# Patient Record
Sex: FEMALE | Race: WHITE | NOT HISPANIC OR LATINO | Employment: FULL TIME | URBAN - METROPOLITAN AREA
[De-identification: names, ages, dates, MRNs, and addresses within clinical notes are randomized per-mention and may not be internally consistent; named-entity substitution may affect disease eponyms.]

---

## 2022-03-04 ENCOUNTER — HISTORICAL (OUTPATIENT)
Dept: ADMINISTRATIVE | Facility: HOSPITAL | Age: 42
End: 2022-03-04

## 2022-03-04 ENCOUNTER — HOSPITAL ENCOUNTER (OUTPATIENT)
Dept: OBSTETRICS AND GYNECOLOGY | Facility: HOSPITAL | Age: 42
End: 2022-03-05
Attending: OBSTETRICS & GYNECOLOGY | Admitting: OBSTETRICS & GYNECOLOGY

## 2022-03-04 LAB
ABS NEUT (OLG): 7.97 (ref 2.1–9.2)
ALBUMIN SERPL-MCNC: 3.6 G/DL (ref 3.5–5)
ALBUMIN/GLOB SERPL: 0.9 {RATIO} (ref 1.1–2)
ALP SERPL-CCNC: 82 U/L (ref 40–150)
ALT SERPL-CCNC: 137 U/L (ref 0–55)
APPEARANCE, UA: NORMAL
AST SERPL-CCNC: 92 U/L (ref 5–34)
B-HCG FREE SERPL-ACNC: 1652.64 [IU]/L
B-HCG SERPL QL: POSITIVE
BACTERIA SPEC CULT: NORMAL
BASOPHILS # BLD AUTO: 0 10*3/UL (ref 0–0.2)
BASOPHILS NFR BLD AUTO: 0 %
BILIRUB SERPL-MCNC: 0.6 MG/DL
BILIRUB UR QL STRIP: NORMAL
BILIRUBIN DIRECT+TOT PNL SERPL-MCNC: 0.2 (ref 0–0.5)
BILIRUBIN DIRECT+TOT PNL SERPL-MCNC: 0.4 (ref 0–0.8)
BUDDING YEAST: NORMAL
BUN SERPL-MCNC: 8.2 MG/DL (ref 7–18.7)
CALCIUM SERPL-MCNC: 9.6 MG/DL (ref 8.7–10.5)
CASTS, UA: 11
CHLORIDE SERPL-SCNC: 105 MMOL/L (ref 98–107)
CO2 SERPL-SCNC: 19 MMOL/L (ref 22–29)
COLOR UR: NORMAL
CREAT SERPL-MCNC: 0.66 MG/DL (ref 0.55–1.02)
CRYSTALS: NORMAL
EOSINOPHIL # BLD AUTO: 0 10*3/UL (ref 0–0.9)
EOSINOPHIL NFR BLD AUTO: 0 %
ERYTHROCYTE [DISTWIDTH] IN BLOOD BY AUTOMATED COUNT: 13.9 % (ref 11.5–17)
GLOBULIN SER-MCNC: 3.9 G/DL (ref 2.4–3.5)
GLUCOSE (UA): NEGATIVE
GLUCOSE SERPL-MCNC: 99 MG/DL (ref 74–100)
HCG UR QL IA.RAPID: NEGATIVE
HCG UR QL IA.RAPID: POSITIVE
HCT VFR BLD AUTO: 42.1 % (ref 37–47)
HEMOLYSIS INTERF INDEX SERPL-ACNC: 94
HGB BLD-MCNC: 13.4 G/DL (ref 12–16)
HGB UR QL STRIP: NORMAL
ICTERIC INTERF INDEX SERPL-ACNC: 0
KETONES UR QL STRIP: NORMAL
LACTATE SERPL-SCNC: 1.2 MMOL/L (ref 0.5–2.2)
LEUKOCYTE ESTERASE UR QL STRIP: NORMAL
LIPEMIC INTERF INDEX SERPL-ACNC: 4
LYMPHOCYTES # BLD AUTO: 1.3 10*3/UL (ref 0.6–4.6)
LYMPHOCYTES NFR BLD AUTO: 13 %
MANUAL DIFF? (OHS): NO
MCH RBC QN AUTO: 27.8 PG (ref 27–31)
MCHC RBC AUTO-ENTMCNC: 31.8 G/DL (ref 33–36)
MCV RBC AUTO: 87.3 FL (ref 80–94)
MONOCYTES # BLD AUTO: 1 10*3/UL (ref 0.1–1.3)
MONOCYTES NFR BLD AUTO: 9 %
NEUTROPHILS # BLD AUTO: 7.97 10*3/UL (ref 2.1–9.2)
NEUTROPHILS NFR BLD AUTO: 76 %
NITRITE UR QL STRIP: NEGATIVE
PH UR STRIP: 7 [PH] (ref 5–9)
PLATELET # BLD AUTO: 202 10*3/UL (ref 130–400)
PLATELET CLUMPS SUSPECT FLAG (OHS): PRESENT
PMV BLD AUTO: 10.6 FL (ref 9.4–12.4)
POS ERR2 (OHS): NORMAL
POTASSIUM SERPL-SCNC: 4.2 MMOL/L (ref 3.5–5.1)
PROT SERPL-MCNC: 7.5 G/DL (ref 6.4–8.3)
PROT UR QL STRIP: NORMAL
RBC # BLD AUTO: 4.82 10*6/UL (ref 4.2–5.4)
RBC #/AREA URNS HPF: 26 /[HPF] (ref 0–2)
SMALL ROUND CELLS, UA: NORMAL
SODIUM SERPL-SCNC: 134 MMOL/L (ref 136–145)
SP GR UR STRIP: 1.03 (ref 1–1.03)
SPERM URNS QL MICRO: NORMAL
SQUAMOUS EPITHELIAL, UA: 8 (ref 0–4)
UROBILINOGEN UR STRIP-ACNC: 1
WBC # SPEC AUTO: 10.4 10*3/UL (ref 4.5–11.5)
WBC #/AREA URNS HPF: 201 /[HPF] (ref 0–3)

## 2022-03-31 ENCOUNTER — HISTORICAL (OUTPATIENT)
Dept: PREADMISSION TESTING | Facility: HOSPITAL | Age: 42
End: 2022-03-31

## 2022-03-31 LAB
ABS NEUT (OLG): 8.22 (ref 2.1–9.2)
BASOPHILS # BLD AUTO: 0 10*3/UL (ref 0–0.2)
BASOPHILS NFR BLD AUTO: 0 %
EOSINOPHIL # BLD AUTO: 0.2 10*3/UL (ref 0–0.9)
EOSINOPHIL NFR BLD AUTO: 2 %
ERYTHROCYTE [DISTWIDTH] IN BLOOD BY AUTOMATED COUNT: 13.6 % (ref 11.5–17)
HCT VFR BLD AUTO: 42.2 % (ref 37–47)
HGB BLD-MCNC: 13.6 G/DL (ref 12–16)
LYMPHOCYTES # BLD AUTO: 2.2 10*3/UL (ref 0.6–4.6)
LYMPHOCYTES NFR BLD AUTO: 19 %
MANUAL DIFF? (OHS): NO
MCH RBC QN AUTO: 28 PG (ref 27–31)
MCHC RBC AUTO-ENTMCNC: 32.2 G/DL (ref 33–36)
MCV RBC AUTO: 86.8 FL (ref 80–94)
MONOCYTES # BLD AUTO: 0.6 10*3/UL (ref 0.1–1.3)
MONOCYTES NFR BLD AUTO: 6 %
NEUTROPHILS # BLD AUTO: 8.22 10*3/UL (ref 2.1–9.2)
NEUTROPHILS NFR BLD AUTO: 73 %
PLATELET # BLD AUTO: 284 10*3/UL (ref 130–400)
PMV BLD AUTO: 10.8 FL (ref 9.4–12.4)
RBC # BLD AUTO: 4.86 10*6/UL (ref 4.2–5.4)
WBC # SPEC AUTO: 11.3 10*3/UL (ref 4.5–11.5)

## 2022-04-01 ENCOUNTER — HISTORICAL (OUTPATIENT)
Dept: SURGERY | Facility: HOSPITAL | Age: 42
End: 2022-04-01

## 2022-05-14 NOTE — OP NOTE
Patient:   Jaylene Valencia            MRN: [id number]            FIN: [id number]               Age:   41 years     Sex:  Female     :  1980   Associated Diagnoses:   None   Author:   Lorie Helton MD      Preop Diagnosis: Missed     Postop Diagnosis: Same    Procedure: Suction D&C    Surgeon: Lorie Helton M.D.    Anesthesia: General     IVF: 300 mL     EBL: 150 mL    Urine output: 100 mL    Specimen:Products of conception    Complications:None    Findings:  Exam revealed 8 week size mobile uterus, multiparous appearing cervix      Procedure: The patient was taken to the OR, general anesthesia was obtained without difficulty.  She was placed in the dorsal lithotomy position with Dontae stirrups.  Exam under anesthesia revealed the above mentioned findings.  She was then prepped and draped in the normal sterile fashion.  A speculum was placed in the vagina.  A single tooth tenaculum was used to grasp the anterior lip of the cervix. Hegar dilators were used to sequentially dilate the cervix to accommodate an 8mm suction curet. Suction curet was advanced to fundus and suction activated with POC noted in tubing. The uterus was curetted in a clockwise fashion until a gritty feeling was noted and suction curettage was again performed. The specimen was sent to pathology.  The tenaculum was removed from the cervix and the puncture site was noted to be hemostatic.  The internal os  was then noted to be closed.  No bleeding was noted.  The patient tolerated the procedure well.  All instrument and sponge counts were correct times two.  The patient was awakened from general anesthesia and taken to the recovery room in stable condition.

## 2022-05-14 NOTE — H&P
Patient:   Jaylene Valencia            MRN: [id number]            FIN: [id number]               Age:   41 years     Sex:  Female     :  1980   Associated Diagnoses:   None   Author:   Lorie Helton MD      Chief Complaint   3/4/2022 10:49 CST       UTI with lower back pain , sent here from OBGYN. sent here for concern for appendicitis. pt 5 weks pregnant        History of Present Illness        The patient presents with 40 y/o  with early pregnancy presented to ER with fever and flank pain.  she had been taking Macrobid for 2 days with no improvement in symptoms.  Serial hcg levels had been monitored as an outpatient with appropriate rise noted.  Progesterone supplementation had been started due to low levels.  No vaginal bleeding or pelvic pain. .     Health Status   Allergies:    Allergic Reactions (Selected)  Severity Not Documented  Penicillin G benzathine- Rash.      Histories   Past Medical History:    Resolved  None ([id number]):  Resolved.   Family History:    Hypertension  Mother  Hyperlipidemia  Father  Sleep apnea  Father     Procedure history:    D & C in  at 28 Years.  tonsillectomy in  at 7 Years.   Social History        Social & Psychosocial Habits    Alcohol  2013 Risk Assessment: Denies Alcohol Use    Employment/School  2013  Status: Employed    Description: Teacher    Previous employment/school: [employer]    Activity level: Desk/Office    Highest education: University degree(s)    Hazardous equipment operation: No    Exercise  2013 Risk Assessment: Does not exercise    Home/Environment  2013 Risk Assessment: No Risk    Nutrition/Health  2013 Risk Assessment: No Risk    Substance Use  2013 Risk Assessment: Denies Substance Abuse    Tobacco  2013 Risk Assessment: Denies Tobacco Use    2022  Use: Never (less than 100 in l    Patient Wants Consult For Cessation Counseling No    Abuse/Neglect  2022  SHX Any  signs of abuse or neglect No    Feels unsafe at home: No    Safe place to go: Yes  .        Physical Examination      Vital Signs (last 24 hrs)_____  Last Charted___________  Temp Oral     36.9 DegC  (MAR 05 07:09)  Heart Rate Peripheral   84 bpm  (MAR 05 07:09)  Resp Rate         20 br/min  (MAR 04 14:30)  SBP      117 mmHg  (MAR 05 07:09)  DBP      69 mmHg  (MAR 05 07:09)  SpO2      100 %  (MAR 04 14:30)  Weight      118 kg  (MAR 04 16:43)  Height      160 cm  (MAR 04 16:43)  BMI      46.09  (MAR 04 16:43)        Impression and Plan   Diagnosis     Early pregnancy, Pyelonephritis.     Orders     Admit for observation  No obstetrical complaints - early GS only noted on u/s with no adnexal masses   Symptoms improving with rocephin, will re-evaluate after 2nd dose. .

## 2022-05-14 NOTE — ED PROVIDER NOTES
"   Patient:   Jaylene Valencia            MRN: [id number]            FIN: [id number]               Age:   41 years     Sex:  Female     :  1980   Associated Diagnoses:   Back pain in pregnancy; Pyelonephritis affecting pregnancy   Author:   Noe Fowler PA-C      Basic Information   Time seen: Date & time 3/4/2022 10:50:00.   History source: Patient.   Arrival mode: Private vehicle, walking.   History limitation: None.      History of Present Illness   The patient presents with 40 yo cf  approx 5 weeks gestation presents for c/o fever, low back pain and "My ob told me to come to be sure I don't have appendicitis." Pt currently on day 3 of macrobid for UTI.  and   I, Noe Fowler PA-C have assumed care of the patient at 11:30 AM.  41-year-old  approx 5 week pregnant female presents to ED right flank pain x 2 days with fever last night. Patient states she is currently being treated for a UTI. Patient states she has completed 2 days of macrobid. Denies dysuria, hematuria, vaginal bleeding, and abdominal pain. Patient reports Tmax of 101.2. OBGYN is Dr. Helton .  The onset was 2  days ago.  The course/duration of symptoms is fluctuating in intensity.  The location is right and back.  Associated symptoms: nausea, fever, chills and back pain.        Review of Systems   Constitutional symptoms:  Fever, chills.    Skin symptoms:  Negative except as documented in HPI.   Eye symptoms:  Negative except as documented in HPI.   ENMT symptoms:  Negative except as documented in HPI.   Respiratory symptoms:  Negative except as documented in HPI.   Cardiovascular symptoms:  Negative except as documented in HPI.   Gastrointestinal symptoms:  No abdominal pain,    Genitourinary symptoms:  No dysuria, no hematuria, no vaginal bleeding.    Musculoskeletal symptoms:  Back pain.   Neurologic symptoms:  Negative except as documented in HPI.   Psychiatric symptoms:  Negative except as documented in HPI. "   Endocrine symptoms:  Negative except as documented in HPI.   Hematologic/Lymphatic symptoms:  Negative except as documented in HPI.   Allergy/immunologic symptoms:  Negative except as documented in HPI.             Additional review of systems information: All other systems reviewed and otherwise negative.      Health Status   Allergies:    Allergic Reactions (Selected)  Severity Not Documented  Penicillin G benzathine- Rash.,    Allergies (1) Active Reaction  penicillin G benzathine rash  .   Medications:  (Selected)   Documented Medications  Documented  Prilosec OTC 20 mg oral EC tablet (pt. own): 1 capsule, Oral, Daily, 0 Refill(s)  Template Non-Formulary Med: 2 gummies, Oral, Daily, 0 Refill(s).      Past Medical/ Family/ Social History   Medical history:    Resolved  None ([id number]):  Resolved..   Surgical history:    D & C in 2008 at 28 Years.  tonsillectomy in 1987 at 7 Years..   Family history:    Hypertension  Mother  Hyperlipidemia  Father  Sleep apnea  Father  .   Social history: Reviewed as documented in chart.      Physical Examination   General:  Alert, no acute distress.    Skin:  Warm, dry.    Head:  Normocephalic, atraumatic.    Neck:  Trachea midline.   Ears, nose, mouth and throat:  Oral mucosa moist.   Cardiovascular:  Regular rate and rhythm, No murmur, Normal peripheral perfusion, No edema.    Respiratory:  Lungs are clear to auscultation, respirations are non-labored, breath sounds are equal, Symmetrical chest wall expansion.    Gastrointestinal:  Soft, Non distended, Normal bowel sounds, Tenderness: Moderate, right flank.    Back:  Normal range of motion.   Musculoskeletal:  Normal ROM.   Neurological:  Alert and oriented to person, place, time, and situation, No focal neurological deficit observed, CN II-XII intact.    Psychiatric:  Cooperative.      Medical Decision Making   Differential Diagnosis:  Abdominal pain.   Documents reviewed:  Emergency department nurses' notes.   Orders   Launch Orders   Laboratory:  POC UPT ED (Order): Urine, Stat collect, Collected, 3/4/2022 10:52 CST by Marily Silva, Nurse collect, Print Label By Order Location  CMP (Order): Stat collect, 3/4/2022 10:52 CST, Blood, Lab Collect, Print Label By Order Location, 3/4/2022 10:52 CST  Urinalysis with Reflex (Order): Stat collect, Urine, 3/4/2022 10:52 CST, Nurse collect, Print Label By Order Location  CBC w/ Auto Diff (Order): Now collect, 3/4/2022 10:52 CST, Blood, Lab Collect, Print Label By Order Location, 3/4/2022 10:52 CST, Launch Orders   Radiology:  US Retroperitoneum Limited (Order): Stat, 3/4/2022 12:13 CST, Other (please specify), right flank pain, None, Ambulatory, Rad Type, Schedule this test  US OB 1st Trimester (Order): Stat, 3/4/2022 12:13 CST, Abdominal Pain, None, Ambulatory, Rad Type, Schedule this test  , Launch Orders   Laboratory:  Beta hCG Quantitative (Order): Stat collect, 3/4/2022 12:26 CST, Blood, Lab Collect, Print Label By Order Location, 3/4/2022 12:26 CST  , Launch Orders   Pharmacy:  Normal Saline (0.9% NS) IV 1,000 mL (Order): 1,000 mL, 1,000 mL, IV, Once, 1,000 mL/hr, start date 3/4/2022 13:27 CST  , Launch Orders   Laboratory:  Lactic Acid (Order): Stat collect, 3/4/2022 14:18 CST, Blood, Lab Collect, Print Label By Order Location, 3/4/2022 14:18 CST  Blood Culture (Order): 3/4/2022 14:18 CST, Stat collect, Blood  Blood Culture (Order): 3/4/2022 14:18 CST, Stat collect, Blood  Pharmacy:  Rocephin 1 g injection (Order): 1 gm, form: Injection Complicated UTI, IM, Once, first dose 3/4/2022 14:19 CST, stop date 3/4/2022 14:19 CST, STAT  , Launch Orders   Admit/Transfer/Discharge:  Admit as Inpatient (Order): 3/4/2022 14:40 CST, [facility] Sunitha PEDERSEN, Lorie LUNA, Back pain in pregnancy  Pyelonephritis affecting pregnancy, No  .    Results review:  Lab results : Lab View   3/4/2022 11:49 CST       Sodium Lvl                134 mmol/L  LOW                             Potassium  Lvl             4.2 mmol/L                             Chloride                  105 mmol/L                             CO2                       19 mmol/L  LOW                             Calcium Lvl               9.6 mg/dL                             Glucose Lvl               99 mg/dL                             BUN                       8.2 mg/dL                             Creatinine                0.66 mg/dL                             eGFR-AA                   >60  NA                             eGFR-ALLEY                  >60 mL/min/1.73 m2  NA                             Bili Total                0.6 mg/dL                             Bili Direct               0.2 mg/dL                             Bili Indirect             0.40 mg/dL                             AST                       92 unit/L  HI                             ALT                       137 unit/L  HI                             Alk Phos                  82 unit/L                             Total Protein             7.5 gm/dL                             Albumin Lvl               3.6 gm/dL                             Globulin                  3.9 gm/dL  HI                             A/G Ratio                 0.9 ratio  LOW                             Beta hCG Qnt              1,652.64 mIU/mL  HI                             WBC                       10.4 x10(3)/mcL                             RBC                       4.82 x10(6)/mcL                             Hgb                       13.4 gm/dL                             Hct                       42.1 %                             Platelet                  202 x10(3)/mcL                             MCV                       87.3 fL                             MCH                       27.8 pg                             MCHC                      31.8 gm/dL  LOW                             RDW                       13.9 %                             MPV                       10.6 fL                              Abs Neut                  7.97 x10(3)/mcL                             Neutro Auto               76 %  NA                             Lymph Auto                13 %  NA                             Mono Auto                 9 %  NA                             Eos Auto                  0 %  NA                             Abs Eos                   0.0 x10(3)/mcL                             Basophil Auto             0 %  NA                             Abs Neutro                7.97 x10(3)/mcL                             Abs Lymph                 1.3 x10(3)/mcL                             Abs Mono                  1.0 x10(3)/mcL                             Abs Baso                  0.0 x10(3)/mcL    3/4/2022 11:17 CST       U Beta hCG Ql             Positive    3/4/2022 10:53 CST       UA Appear                 CLOUDY                             UA Color                  DK YELLOW                             UA Spec Grav              1.029                             UA Bili                   1+                             UA pH                     7.0                             UA Urobilinogen           1.0                             UA Blood                  2+                             UA Glucose                Negative                             UA Ketones                1+                             UA Protein                1+                             UA Nitrite                Negative                             UA Leuk Est               3+                             UA WBC                    201 /HPF  HI                             UA RBC                    26 /HPF  HI                             UA Bacteria               2+ /HPF                             UA Squam Epithelial       8 /HPF  HI    .   Radiology results:  Rad Results (ST)  < 12 hrs   Accession: [id number]  Order: US OB 1st Trimester  Report Dt/Tm: 03/04/2022 13:16  Report:   EXAMINATION  US OB 1st Trimester      INDICATION  Abdominal Pain     LMP: 1/26/2022     Comparison: None     FINDINGS  The uterus measures 9.6 x 5.6 x 6.6 cm in size. A single intrauterine  gestational sac is identified. There is no subchorionic hemorrhage  identified.     Fetal pole and yolk sac are not identified.  Mean sac diameter measures 0.6 mm, corresponding to estimated  gestational age of 5 weeks and 1 day (+/- 3 days).     The right ovary measures 5.7 x 1.9 x 2.4 cm in size. A right-sided  corpus luteal cyst measures 1.6 cm. The left ovary measures 2.5 x 2 x  2.7 cm in size. The ovaries contain arterial and venous flow.     There is no adnexal mass or free fluid.      IMPRESSION  1.  Single intrauterine gestational sac with AUA of 5 weeks and 1 day.  Recommend continued follow-up with serial beta-hCG levels and  ultrasound as clinically indicated.  2.  No adnexal mass identified.    Accession: [id number]  Order: US Retroperitoneum Limited  Report Dt/Tm: 03/04/2022 13:07  Report:      History  Right flank pain     Reference Study  None available     Findings  Grayscale and color Doppler images of the kidneys and bladder were  obtained. The right kidney measures 10 cm and the left kidney measures  12 cm in length. The right and left kidney demonstrate normal  echogenicity.  No hydronephrosis or mass is identified. The bladder is  unremarkable.     Impression  No sonographic abnormality of the kidneys.                 .      Impression and Plan   Diagnosis   Back pain in pregnancy (UYJ42-XQ O99.891)   Pyelonephritis affecting pregnancy (MQA98-FQ O23.00)      Calls-Consults   -  Discussed case with Dr. Helton- recommends IV rocephin, admit   Spoke with Dr. Gonzales -he is aware of the patient's admission and has made face to face contact with the patient. .   Plan   Condition: Stable.    Disposition: Admit time  3/4/2022 14:40:00, Sunitha PEDERSEN, Lorie JULES    Counseled: Patient, Family, Regarding treatment plan.       Addendum       Teaching-Supervisory Addendum-Brief   I participated in the following activities of this patients care: the medical history, the physical exam, medical decision making.   I personally performed: supervision of the patient's care, the medical history, the physical exam, the medical decision making.   The case was discussed with: midlevel.   Results interpretation: I agree with the study interpretation in this patient's care, Physically seen and evaluated this patient I had face-to-face contact with this patient I performed a focused physical evaluation mainly I was involved in the medical decision making of this patient.  She is a  7 para 4 SAB 2 5 to 6 weeks pregnant now experiencing fever and chills since Wednesday so approximately 72 hours found to have urinary tract infection.  Patient is high risk for losing the baby with a urinary tract infection at this early stage.  Spine is at the bedside.  Evaluation this is a moderately obese white female extremely pleasant significant right CVA tenderness normal TMs nose and oropharynx heart regular rate and rhythm lungs are clear abdomen seems benign CVA tenderness present on the right extremities without clubbing or cyanosis edema neurological is completely appropriate she has had fever chills but not nausea vomiting she has had no diarrhea.  She has had no vaginal bleeding or discharge.  Consultation obtained with Dr. Aparicio who is her regular OB/GYN physician decision was made to admit the patient intervention use intravenous Rocephin I have had a substantive  input into this patient's care having the medical decision making and choice of antibiotics.  Physically seen and evaluated this patient I concur with the documentation as obtained above.

## 2022-05-14 NOTE — DISCHARGE SUMMARY
Patient:   Jaylene Valencia            MRN: [id number]            FIN: [id number]               Age:   41 years     Sex:  Female     :  1980   Associated Diagnoses:   None   Author:   Lorie Helton MD      42yo  with early pregnancy admitted with flank pain and fever with  presumed diagnosis of pyleonephritis.   Vital signs stable and afebrile throughout stay.  Clinically improved after 2 doses of Rocephin.  Discharge instructions and precautions reviewed with the patient. Sent home with rx for keflex.

## 2022-07-07 LAB
HBV SURFACE AG SERPL QL IA: NEGATIVE
HCT VFR BLD AUTO: 42.3 % (ref 36–46)
HGB BLD-MCNC: 13.9 G/DL (ref 12–16)
HIV 1+2 AB+HIV1 P24 AG SERPL QL IA: NEGATIVE
HIV-1 AND HIV-2 ANTIBODIES: NEGATIVE
N GONORRHOEAE, AMPLIFIED DNA: NEGATIVE
PLATELET # BLD AUTO: 287 K/UL (ref 150–399)
RPR: NONREACTIVE
RUBELLA IMMUNE STATUS: NORMAL

## 2022-10-13 ENCOUNTER — OFFICE VISIT (OUTPATIENT)
Dept: URGENT CARE | Facility: CLINIC | Age: 42
End: 2022-10-13
Payer: COMMERCIAL

## 2022-10-13 VITALS
RESPIRATION RATE: 20 BRPM | TEMPERATURE: 99 F | DIASTOLIC BLOOD PRESSURE: 86 MMHG | OXYGEN SATURATION: 97 % | BODY MASS INDEX: 36.68 KG/M2 | SYSTOLIC BLOOD PRESSURE: 152 MMHG | HEART RATE: 97 BPM | HEIGHT: 63 IN | WEIGHT: 207 LBS

## 2022-10-13 DIAGNOSIS — U07.1 COVID-19 VIRUS DETECTED: ICD-10-CM

## 2022-10-13 DIAGNOSIS — U07.1 COVID: Primary | ICD-10-CM

## 2022-10-13 DIAGNOSIS — R50.9 FEVER, UNSPECIFIED FEVER CAUSE: ICD-10-CM

## 2022-10-13 LAB
CTP QC/QA: YES
SARS-COV-2 RDRP RESP QL NAA+PROBE: POSITIVE

## 2022-10-13 PROCEDURE — 99213 OFFICE O/P EST LOW 20 MIN: CPT | Mod: 25,,, | Performed by: FAMILY MEDICINE

## 2022-10-13 PROCEDURE — 1160F PR REVIEW ALL MEDS BY PRESCRIBER/CLIN PHARMACIST DOCUMENTED: ICD-10-PCS | Mod: CPTII,,, | Performed by: FAMILY MEDICINE

## 2022-10-13 PROCEDURE — 87804 POCT INFLUENZA A/B: ICD-10-PCS | Mod: 59,QW,, | Performed by: FAMILY MEDICINE

## 2022-10-13 PROCEDURE — 1159F MED LIST DOCD IN RCRD: CPT | Mod: CPTII,,, | Performed by: FAMILY MEDICINE

## 2022-10-13 PROCEDURE — 3077F SYST BP >= 140 MM HG: CPT | Mod: CPTII,,, | Performed by: FAMILY MEDICINE

## 2022-10-13 PROCEDURE — 3077F PR MOST RECENT SYSTOLIC BLOOD PRESSURE >= 140 MM HG: ICD-10-PCS | Mod: CPTII,,, | Performed by: FAMILY MEDICINE

## 2022-10-13 PROCEDURE — 3079F PR MOST RECENT DIASTOLIC BLOOD PRESSURE 80-89 MM HG: ICD-10-PCS | Mod: CPTII,,, | Performed by: FAMILY MEDICINE

## 2022-10-13 PROCEDURE — 87804 INFLUENZA ASSAY W/OPTIC: CPT | Mod: QW,,, | Performed by: FAMILY MEDICINE

## 2022-10-13 PROCEDURE — 3079F DIAST BP 80-89 MM HG: CPT | Mod: CPTII,,, | Performed by: FAMILY MEDICINE

## 2022-10-13 PROCEDURE — 87635: ICD-10-PCS | Mod: QW,,, | Performed by: FAMILY MEDICINE

## 2022-10-13 PROCEDURE — 99213 PR OFFICE/OUTPT VISIT, EST, LEVL III, 20-29 MIN: ICD-10-PCS | Mod: 25,,, | Performed by: FAMILY MEDICINE

## 2022-10-13 PROCEDURE — 87635 SARS-COV-2 COVID-19 AMP PRB: CPT | Mod: QW,,, | Performed by: FAMILY MEDICINE

## 2022-10-13 PROCEDURE — 1160F RVW MEDS BY RX/DR IN RCRD: CPT | Mod: CPTII,,, | Performed by: FAMILY MEDICINE

## 2022-10-13 PROCEDURE — 1159F PR MEDICATION LIST DOCUMENTED IN MEDICAL RECORD: ICD-10-PCS | Mod: CPTII,,, | Performed by: FAMILY MEDICINE

## 2022-10-13 RX ORDER — PANTOPRAZOLE SODIUM 20 MG/1
TABLET, DELAYED RELEASE ORAL
COMMUNITY
Start: 2022-09-28

## 2022-10-13 NOTE — PROGRESS NOTES
"Subjective:       Patient ID: Jaylene Valencia is a 42 y.o. female.    Vitals:  height is 5' 3" (1.6 m) and weight is 93.9 kg (207 lb). Her temperature is 98.6 °F (37 °C). Her blood pressure is 152/86 (abnormal) and her pulse is 97. Her respiration is 20 and oxygen saturation is 97%.     Chief Complaint: Fatigue (Fatigue, headache, sinus congestion, fever, little cough, /Pt is 23 wks pregnant)    23 weeks pregnant with about 1-2 days of fatigue, cough and fever.       Constitution: Positive for fever. Negative for chills and fatigue.   HENT:  Positive for postnasal drip. Negative for congestion, sinus pressure and trouble swallowing.    Neck: Negative for neck pain and neck stiffness.   Cardiovascular:  Negative for chest pain, leg swelling and sob on exertion.   Respiratory:  Positive for cough. Negative for chest tightness, shortness of breath and wheezing.    Neurological:  Negative for dizziness, disorientation and altered mental status.   Psychiatric/Behavioral:  Negative for altered mental status and disorientation.      Objective:      Physical Exam   Constitutional: She is oriented to person, place, and time. She appears well-developed. No distress.   HENT:   Head: Normocephalic.   Ears:   Right Ear: Tympanic membrane and external ear normal.   Left Ear: Tympanic membrane and external ear normal.   Nose: Rhinorrhea present.   Mouth/Throat: Uvula is midline and mucous membranes are normal. No uvula swelling. Cobblestoning present. No oropharyngeal exudate or posterior oropharyngeal edema. Tonsils are 0 on the right. Tonsils are 0 on the left. No tonsillar exudate.   Eyes: Pupils are equal, round, and reactive to light. Right eye exhibits no discharge. Left eye exhibits no discharge.   Neck: Neck supple. No tracheal deviation present.   Cardiovascular: Normal rate, regular rhythm and normal heart sounds.   No murmur heard.  Pulmonary/Chest: Effort normal and breath sounds normal. No stridor. No " respiratory distress. She has no wheezes.   Lymphadenopathy:     She has no cervical adenopathy.   Neurological: no focal deficit. She is alert and oriented to person, place, and time.   Skin: Skin is warm and dry.   Psychiatric: Thought content normal.   Nursing note and vitals reviewed.      Assessment:       1. COVID    2. Fever, unspecified fever cause          Plan:         COVID    Fever, unspecified fever cause  -     POCT Influenza A/B  -     POCT COVID-19 Rapid Screening          COVID positive.   Flu negative.

## 2022-10-14 LAB
CTP QC/QA: YES
FLUAV AG NPH QL: NEGATIVE
FLUBV AG NPH QL: NEGATIVE

## 2022-11-19 ENCOUNTER — HOSPITAL ENCOUNTER (EMERGENCY)
Facility: HOSPITAL | Age: 42
Discharge: HOME | End: 2022-11-19
Attending: OBSTETRICS & GYNECOLOGY
Payer: COMMERCIAL

## 2022-11-19 VITALS
TEMPERATURE: 98 F | RESPIRATION RATE: 18 BRPM | HEART RATE: 83 BPM | DIASTOLIC BLOOD PRESSURE: 57 MMHG | BODY MASS INDEX: 49.43 KG/M2 | SYSTOLIC BLOOD PRESSURE: 120 MMHG | HEIGHT: 63 IN | WEIGHT: 279 LBS

## 2022-11-19 PROBLEM — R03.0 ELEVATED BLOOD PRESSURE READING: Status: ACTIVE | Noted: 2022-11-19

## 2022-11-19 PROBLEM — O99.213 OBESITY AFFECTING PREGNANCY IN THIRD TRIMESTER: Status: ACTIVE | Noted: 2022-11-19

## 2022-11-19 PROCEDURE — 99282 EMERGENCY DEPT VISIT SF MDM: CPT

## 2022-11-19 RX ORDER — LABETALOL 200 MG/1
200 TABLET, FILM COATED ORAL 2 TIMES DAILY
COMMUNITY

## 2022-11-20 NOTE — ED PROVIDER NOTES
"       [facility] NOTE  [facility]     Admit Date: 2022  [facility] Physician: April Machado  Primary OBGYN: Dr. Helton    Admit Diagnosis/Chief Complaint:  elevated BP reading on home cuff , obesity BMI 49  Discharge Diagnosis:  normal blood pressures    Chief Complaint   Patient presents with    pt states BP at home 160//101, denies HA, dizziness,        Hospital Course:  Jaylene Valencia is a 42 y.o.  at 28w1d presents complaining of elevated BP reading on brand new automatic cuff. She had been taking BP at level of heart, sitting down while resting arm on surface-- only on her R side. Unsure if she is using a large size cuff.  This IUP is complicated by BMI 49. Denies hx of BP issues in prior pregnancy.    Patient denies vaginal bleeding, leakage of fluid, contractions, headache, vision changes, RUQ pain, dysuria, fever, and nausea/vomiting.  Fetal Movement: normal.    BP (!) 120/57   Pulse 83   Temp 98.4 °F (36.9 °C) (Oral)   Resp 18   Ht 5' 3" (1.6 m)   Wt 126.6 kg (279 lb)   Breastfeeding No   BMI 49.42 kg/m²   Temp:  [98.4 °F (36.9 °C)] 98.4 °F (36.9 °C)  Pulse:  [83-88] 83  Resp:  [18] 18  BP: (117-127)/(57-69) 120/57    General: in no apparent distress well developed and well nourished non-toxic alert oriented times 3  Cardiovascular: regular rate and rhythm no murmurs  Respiratory: clear to auscultation, no wheezes, rales or rhonchi, symmetric air entry unlabored  Abdominal: soft, nontender, nondistended, no abnormal masses, no epigastric pain obese FHT present  Back: lumbar tenderness absent CVA tenderness none suprapubic tenderness absent  Extremeties no redness or tenderness in the calves or thighs no edema              EFM: Cat 1, 145 modBTV, +accel, no decel (reassuring, reactive)  TOCO: none      Medical Decision Making:      LABS:   No results found for this or any previous visit (from the past 24 hour(s)).    Imaging Results    None      "     ASSESMENT: Jaylene Valencia is a 42 y.o.   at 28w1d with NORMAL BP READINGS    Discharge Diagnosis:   Patient Active Problem List   Diagnosis    Elevated blood pressure reading    Obesity affecting pregnancy in third trimester       Status:Stable    Disposition:  discharged to home    Medications:   NONE    Patient Instructions:   - recommend she bring BP cuff and monitor to her next OB appt and calibrate/check cuff size  - Pt was given routine pregnancy instructions including to return to triage if she had any vaginal bleeding (other than spotting for the next 48hrs), any loss of fluid like her water broke, decreased fetal movement, or contractions Q 5min lasting for 2 or more hours. Pt was also instructed to drink copious water. Patient voiced understanding of all these instructions and was subsequently discharged home. Tylenol use and maternity belt use discussed. All questions answered. Pt left [facility] with good understanding of plan.   Preeclampsia/ROM/labor/fever/decreased FM with [facility] precautions discussed, voiced understanding     She will follow up with her primary OB as scheduled, appt on Tues this week    April Machado MD  OB/GYN Hospitalist  10:10 PM 2022

## 2023-01-04 ENCOUNTER — HOSPITAL ENCOUNTER (OUTPATIENT)
Facility: HOSPITAL | Age: 43
Discharge: HOME | End: 2023-01-05
Attending: OBSTETRICS & GYNECOLOGY | Admitting: OBSTETRICS & GYNECOLOGY
Payer: COMMERCIAL

## 2023-01-04 DIAGNOSIS — O16.3 ELEVATED BLOOD PRESSURE AFFECTING PREGNANCY IN THIRD TRIMESTER, ANTEPARTUM: ICD-10-CM

## 2023-01-04 PROBLEM — O10.919 CHRONIC HYPERTENSION IN PREGNANCY: Status: ACTIVE | Noted: 2023-01-04

## 2023-01-04 PROBLEM — O60.00 PRETERM LABOR: Status: ACTIVE | Noted: 2023-01-04

## 2023-01-04 LAB
ALBUMIN SERPL-MCNC: 3.1 G/DL (ref 3.5–5)
ALBUMIN/GLOB SERPL: 0.9 RATIO (ref 1.1–2)
ALP SERPL-CCNC: 122 UNIT/L (ref 40–150)
ALT SERPL-CCNC: 26 UNIT/L (ref 0–55)
AST SERPL-CCNC: 26 UNIT/L (ref 5–34)
BASOPHILS # BLD AUTO: 0.04 X10(3)/MCL (ref 0–0.2)
BASOPHILS NFR BLD AUTO: 0.3 %
BILIRUBIN DIRECT+TOT PNL SERPL-MCNC: 0.4 MG/DL
BUN SERPL-MCNC: 12.6 MG/DL (ref 7–18.7)
CALCIUM SERPL-MCNC: 9.5 MG/DL (ref 8.4–10.2)
CHLORIDE SERPL-SCNC: 108 MMOL/L (ref 98–107)
CO2 SERPL-SCNC: 17 MMOL/L (ref 22–29)
CREAT SERPL-MCNC: 0.78 MG/DL (ref 0.55–1.02)
EOSINOPHIL # BLD AUTO: 0.14 X10(3)/MCL (ref 0–0.9)
EOSINOPHIL NFR BLD AUTO: 1.1 %
ERYTHROCYTE [DISTWIDTH] IN BLOOD BY AUTOMATED COUNT: 14.4 % (ref 11–14.5)
GFR SERPLBLD CREATININE-BSD FMLA CKD-EPI: >90 MLS/MIN/1.73/M2
GLOBULIN SER-MCNC: 3.5 GM/DL (ref 2.4–3.5)
GLUCOSE SERPL-MCNC: 78 MG/DL (ref 74–100)
HCT VFR BLD AUTO: 36.7 % (ref 37–47)
HGB BLD-MCNC: 11.8 GM/DL (ref 12–16)
IMM GRANULOCYTES # BLD AUTO: 0.29 X10(3)/MCL (ref 0–0.04)
IMM GRANULOCYTES NFR BLD AUTO: 2.2 %
LDH SERPL-CCNC: 240 U/L (ref 125–220)
LYMPHOCYTES # BLD AUTO: 1.48 X10(3)/MCL (ref 0.6–4.6)
LYMPHOCYTES NFR BLD AUTO: 11.4 %
MCH RBC QN AUTO: 26.8 PG
MCHC RBC AUTO-ENTMCNC: 32.2 MG/DL (ref 33–36)
MCV RBC AUTO: 83.4 FL (ref 80–94)
MONOCYTES # BLD AUTO: 0.75 X10(3)/MCL (ref 0.1–1.3)
MONOCYTES NFR BLD AUTO: 5.8 %
NEUTROPHILS # BLD AUTO: 10.28 X10(3)/MCL (ref 2.1–9.2)
NEUTROPHILS NFR BLD AUTO: 79.2 %
NRBC BLD AUTO-RTO: 0 % (ref 0–1)
PLATELET # BLD AUTO: 259 X10(3)/MCL (ref 140–371)
PMV BLD AUTO: 11.1 FL (ref 9.4–12.4)
POTASSIUM SERPL-SCNC: 4.1 MMOL/L (ref 3.5–5.1)
PROT SERPL-MCNC: 6.6 GM/DL (ref 6.4–8.3)
RBC # BLD AUTO: 4.4 X10(6)/MCL (ref 4.2–5.4)
SODIUM SERPL-SCNC: 135 MMOL/L (ref 136–145)
URATE SERPL-MCNC: 5 MG/DL (ref 2.6–6)
WBC # SPEC AUTO: 13 X10(3)/MCL (ref 4.5–11.5)

## 2023-01-04 PROCEDURE — 80053 COMPREHEN METABOLIC PANEL: CPT | Performed by: OBSTETRICS & GYNECOLOGY

## 2023-01-04 PROCEDURE — 96360 HYDRATION IV INFUSION INIT: CPT

## 2023-01-04 PROCEDURE — 85025 COMPLETE CBC W/AUTO DIFF WBC: CPT | Performed by: OBSTETRICS & GYNECOLOGY

## 2023-01-04 PROCEDURE — G0379 DIRECT REFER HOSPITAL OBSERV: HCPCS

## 2023-01-04 PROCEDURE — G0378 HOSPITAL OBSERVATION PER HR: HCPCS

## 2023-01-04 PROCEDURE — 83615 LACTATE (LD) (LDH) ENZYME: CPT | Performed by: OBSTETRICS & GYNECOLOGY

## 2023-01-04 PROCEDURE — 96372 THER/PROPH/DIAG INJ SC/IM: CPT | Performed by: OBSTETRICS & GYNECOLOGY

## 2023-01-04 PROCEDURE — 87653 STREP B DNA AMP PROBE: CPT | Performed by: OBSTETRICS & GYNECOLOGY

## 2023-01-04 PROCEDURE — 25000003 PHARM REV CODE 250: Performed by: OBSTETRICS & GYNECOLOGY

## 2023-01-04 PROCEDURE — 84550 ASSAY OF BLOOD/URIC ACID: CPT | Performed by: OBSTETRICS & GYNECOLOGY

## 2023-01-04 PROCEDURE — 63600175 PHARM REV CODE 636 W HCPCS: Performed by: OBSTETRICS & GYNECOLOGY

## 2023-01-04 PROCEDURE — 96361 HYDRATE IV INFUSION ADD-ON: CPT

## 2023-01-04 RX ORDER — LABETALOL 200 MG/1
200 TABLET, FILM COATED ORAL 2 TIMES DAILY
Status: DISCONTINUED | OUTPATIENT
Start: 2023-01-04 | End: 2023-01-05 | Stop reason: HOSPADM

## 2023-01-04 RX ORDER — LABETALOL 200 MG/1
200 TABLET, FILM COATED ORAL 2 TIMES DAILY
Status: DISCONTINUED | OUTPATIENT
Start: 2023-01-04 | End: 2023-01-04

## 2023-01-04 RX ORDER — BETAMETHASONE SODIUM PHOSPHATE AND BETAMETHASONE ACETATE 3; 3 MG/ML; MG/ML
6 INJECTION, SUSPENSION INTRA-ARTICULAR; INTRALESIONAL; INTRAMUSCULAR; SOFT TISSUE ONCE
Status: COMPLETED | OUTPATIENT
Start: 2023-01-04 | End: 2023-01-04

## 2023-01-04 RX ORDER — BETAMETHASONE SODIUM PHOSPHATE AND BETAMETHASONE ACETATE 3; 3 MG/ML; MG/ML
12 INJECTION, SUSPENSION INTRA-ARTICULAR; INTRALESIONAL; INTRAMUSCULAR; SOFT TISSUE ONCE
Status: COMPLETED | OUTPATIENT
Start: 2023-01-05 | End: 2023-01-05

## 2023-01-04 RX ADMIN — LABETALOL HYDROCHLORIDE 200 MG: 200 TABLET, FILM COATED ORAL at 07:01

## 2023-01-04 RX ADMIN — SODIUM CHLORIDE, POTASSIUM CHLORIDE, SODIUM LACTATE AND CALCIUM CHLORIDE: 600; 310; 30; 20 INJECTION, SOLUTION INTRAVENOUS at 08:01

## 2023-01-04 RX ADMIN — BETAMETHASONE SODIUM PHOSPHATE AND BETAMETHASONE ACETATE 6 MG: 3; 3 INJECTION, SUSPENSION INTRA-ARTICULAR; INTRALESIONAL; INTRAMUSCULAR at 05:01

## 2023-01-04 NOTE — H&P
HISTORY AND PHYSICAL                                                OBSTETRICS          Subjective:      Jaylene Valencia is a 42 y.o.  female with IUP at 34w5d weeks gestation who presents to L&D for observation.  She ws noted to have severe range BP at office along with new onset vaginal bleeding. SVE in office 3-4cm and vertex. Pertinent medical history for this pregnancy includes CHTN on labetalol 200mg BID and AMA.  Care this pregnancy has been with Dr. Helton    PMHx:   Past Medical History:   Diagnosis Date    Essential (primary) hypertension     Kidney infection in mother during pregnancy        PSHx:   Past Surgical History:   Procedure Laterality Date    DILATION AND CURETTAGE OF UTERUS      DILATION AND CURETTAGE OF UTERUS      NO PAST SURGERIES         All:   Review of patient's allergies indicates:   Allergen Reactions    Penicillin g benzathine Rash       Meds:   Medications Prior to Admission   Medication Sig Dispense Refill Last Dose    labetaloL (NORMODYNE) 200 MG tablet Take 200 mg by mouth 2 (two) times daily.   2023 at 0600    pantoprazole (PROTONIX) 20 MG tablet Take by mouth.   2023 at 0600       SH:   Social History     Socioeconomic History    Marital status:    Tobacco Use    Smoking status: Never    Smokeless tobacco: Never   Substance and Sexual Activity    Alcohol use: Not Currently    Drug use: Never       FH:   Family History   Problem Relation Age of Onset    Hypertension Mother     Arrhythmia Mother     Hypertension Father        OBHx:   OB History    Para Term  AB Living   7 4 4 0 2 4   SAB IAB Ectopic Multiple Live Births   2 0 0 0 4      # Outcome Date GA Lbr Ramiro/2nd Weight Sex Delivery Anes PTL Lv   7 Current            6 SAB 22     SAB      5 Term 02/23/15    F Vag-Spont   RAEGAN   4 Term 13    M Vag-Spont   RAEGAN   3 Term 11    F Vag-Spont   RAEGAN   2 Term 09    F Vag-Spont   RAEGAN   1 SAB 2008     SAB     "      Objective:      Temp 98.1 °F (36.7 °C) (Oral)   Ht 5' 3" (1.6 m)   Wt 129.3 kg (285 lb)   Breastfeeding No   BMI 50.49 kg/m²   Body mass index is 50.49 kg/m².    General:   alert and cooperative   HEENT:  normocephalic, atraumatic   Lungs:   clear to auscultation bilaterally   Heart:   regular rate and rhythm, S1, S2 normal   Abdomen:  gravid, non-tender   Extremities non-tender, no edema   Derm: no rashes or lesions   Psych: appropriate mood and affect   Pelvis:  adequate       FHT: Category: 1                 TOCO: Contractions: irregular           Lab Review  GBS: unknown      Assessment:     42 y.o.  at 34w5d weeks gestation.  CHTN   labor    Active Hospital Problems    Diagnosis  POA    * labor [O60.00]  Unknown    Chronic hypertension in pregnancy [O10.919]  Unknown      Resolved Hospital Problems   No resolved problems to display.          Plan:     1. Admit for observation  2. Pre eclampsia labs and serial BP, will restart home labetalol  3. Will monitor contractions - currently comfortable and not feeling uterine activity. No further vaginal bleeding  4. BMZ today and 2nd dose in 24 hours, GBS collected  5. Fetal status reassuring      "

## 2023-01-05 VITALS
DIASTOLIC BLOOD PRESSURE: 60 MMHG | WEIGHT: 285 LBS | BODY MASS INDEX: 50.5 KG/M2 | TEMPERATURE: 98 F | RESPIRATION RATE: 18 BRPM | HEART RATE: 71 BPM | OXYGEN SATURATION: 100 % | SYSTOLIC BLOOD PRESSURE: 123 MMHG | HEIGHT: 63 IN

## 2023-01-05 PROCEDURE — G0378 HOSPITAL OBSERVATION PER HR: HCPCS

## 2023-01-05 PROCEDURE — 96361 HYDRATE IV INFUSION ADD-ON: CPT

## 2023-01-05 PROCEDURE — 25000003 PHARM REV CODE 250: Performed by: OBSTETRICS & GYNECOLOGY

## 2023-01-05 PROCEDURE — 96360 HYDRATION IV INFUSION INIT: CPT

## 2023-01-05 PROCEDURE — 96372 THER/PROPH/DIAG INJ SC/IM: CPT | Performed by: OBSTETRICS & GYNECOLOGY

## 2023-01-05 PROCEDURE — 63600175 PHARM REV CODE 636 W HCPCS: Performed by: OBSTETRICS & GYNECOLOGY

## 2023-01-05 RX ORDER — SODIUM CHLORIDE, SODIUM LACTATE, POTASSIUM CHLORIDE, CALCIUM CHLORIDE 600; 310; 30; 20 MG/100ML; MG/100ML; MG/100ML; MG/100ML
INJECTION, SOLUTION INTRAVENOUS CONTINUOUS
Status: DISCONTINUED | OUTPATIENT
Start: 2023-01-05 | End: 2023-01-05 | Stop reason: HOSPADM

## 2023-01-05 RX ADMIN — LABETALOL HYDROCHLORIDE 200 MG: 200 TABLET, FILM COATED ORAL at 08:01

## 2023-01-05 RX ADMIN — BETAMETHASONE SODIUM PHOSPHATE AND BETAMETHASONE ACETATE 12 MG: 3; 3 INJECTION, SUSPENSION INTRA-ARTICULAR; INTRALESIONAL; INTRAMUSCULAR at 04:01

## 2023-01-05 RX ADMIN — SODIUM CHLORIDE, POTASSIUM CHLORIDE, SODIUM LACTATE AND CALCIUM CHLORIDE: 600; 310; 30; 20 INJECTION, SOLUTION INTRAVENOUS at 04:01

## 2023-01-05 NOTE — PROGRESS NOTES
Subjective:   Patient very occasionally feeling mild contractions now.  No further bleeding/spotting. Denies headache, vision changes or RUQ pain.  Normal fetal movement.     Objective:    Temp:  [98.1 °F (36.7 °C)-98.8 °F (37.1 °C)] 98.4 °F (36.9 °C)  Pulse:  [75-89] 75  Resp:  [16-18] 16  BP: (117-167)/(60-86) 133/64        Labs:   Fetal status reassuring, Cat I  Uterine irritability on toco    Assessment/Plan:   1. 42 y.o.  at 34w6d CHTN, Arrested PTL  2. Due for 2nd BMZ this pm  3. Will continue home labetalol and monitor BP  4. Continue toco/efm

## 2023-01-05 NOTE — DISCHARGE SUMMARY
Delivery Discharge Summary  Obstetrics      Primary OB Clinician: Lorie Helton MD    Discharge Provider: Lorie Helton MD    Admission date: 2023  Discharge date: 2023    Admit Dx:   Discharge Dx:    Patient Active Problem List   Diagnosis    Elevated blood pressure reading    Obesity affecting pregnancy in third trimester    Chronic hypertension in pregnancy     labor         Hospital Course:  Jaylene Valencia is a 42 y.o.  at 34 wga who was admitted on 2023 for observation. She has a history of chtn on meds and had elevated BP at office visit.  Also with new complaint of vaginal bleeding with SVE 3-4cm. She received BMZ x 2, bleeding resolved and BP normalized.  Fetal status remained reassuring.  Vitals were stable. She is was discharged home with close followup due to CHTN and arrested  PTL.        CBC  Lab Results   Component Value Date    WBC 13.0 (H) 2023    HGB 11.8 (L) 2023    HCT 36.7 (L) 2023    MCV 83.4 2023     2023       This patient has no babies on file.    Disposition: To home, self care    Follow Up:  1 week    Patient Instructions:   1. Call the office for any bleeding , contractions, LOF or Decreased fetal movement  2. Call if headache, vision changes or RUQ pain.    Current Discharge Medication List        CONTINUE these medications which have NOT CHANGED    Details   labetaloL (NORMODYNE) 200 MG tablet Take 200 mg by mouth 2 (two) times daily.      pantoprazole (PROTONIX) 20 MG tablet Take by mouth.             Lorie Helton     PACU

## 2023-01-05 NOTE — PROGRESS NOTES
01/05/23 1522   [facility] Note - Strip   Strip Reviewed by [facility] Nurse? Yes   [facility] Note - Communication   [place] Nurse Communicated with Bedside Nurse Regarding: Fetal Status   [facility] Note - Notification   Nurse Notified? Yes   Name of Nurse Misti

## 2023-01-06 LAB — STREP B PCR (OHS): NOT DETECTED

## 2023-01-16 ENCOUNTER — HOSPITAL ENCOUNTER (OUTPATIENT)
Facility: HOSPITAL | Age: 43
Discharge: HOME | End: 2023-01-16
Attending: OBSTETRICS & GYNECOLOGY | Admitting: OBSTETRICS & GYNECOLOGY
Payer: COMMERCIAL

## 2023-01-16 VITALS
HEIGHT: 63 IN | TEMPERATURE: 99 F | RESPIRATION RATE: 16 BRPM | WEIGHT: 280 LBS | SYSTOLIC BLOOD PRESSURE: 135 MMHG | HEART RATE: 85 BPM | DIASTOLIC BLOOD PRESSURE: 70 MMHG | BODY MASS INDEX: 49.61 KG/M2 | OXYGEN SATURATION: 98 %

## 2023-01-16 DIAGNOSIS — O16.9 HYPERTENSION AFFECTING PREGNANCY: ICD-10-CM

## 2023-01-16 LAB
ALBUMIN SERPL-MCNC: 3 G/DL (ref 3.5–5)
ALBUMIN/GLOB SERPL: 0.9 RATIO (ref 1.1–2)
ALP SERPL-CCNC: 132 UNIT/L (ref 40–150)
ALT SERPL-CCNC: 18 UNIT/L (ref 0–55)
AST SERPL-CCNC: 17 UNIT/L (ref 5–34)
BASOPHILS # BLD AUTO: 0.03 X10(3)/MCL (ref 0–0.2)
BASOPHILS NFR BLD AUTO: 0.2 %
BILIRUBIN DIRECT+TOT PNL SERPL-MCNC: 0.3 MG/DL
BUN SERPL-MCNC: 12.4 MG/DL (ref 7–18.7)
CALCIUM SERPL-MCNC: 9.2 MG/DL (ref 8.4–10.2)
CHLORIDE SERPL-SCNC: 108 MMOL/L (ref 98–107)
CO2 SERPL-SCNC: 16 MMOL/L (ref 22–29)
CREAT SERPL-MCNC: 0.65 MG/DL (ref 0.55–1.02)
EOSINOPHIL # BLD AUTO: 0.07 X10(3)/MCL (ref 0–0.9)
EOSINOPHIL NFR BLD AUTO: 0.5 %
ERYTHROCYTE [DISTWIDTH] IN BLOOD BY AUTOMATED COUNT: 14.5 % (ref 11.5–17)
GFR SERPLBLD CREATININE-BSD FMLA CKD-EPI: >60 MLS/MIN/1.73/M2
GLOBULIN SER-MCNC: 3.4 GM/DL (ref 2.4–3.5)
GLUCOSE SERPL-MCNC: 80 MG/DL (ref 74–100)
HCT VFR BLD AUTO: 36.6 % (ref 37–47)
HGB BLD-MCNC: 11.7 GM/DL (ref 12–16)
IMM GRANULOCYTES # BLD AUTO: 0.22 X10(3)/MCL (ref 0–0.04)
IMM GRANULOCYTES NFR BLD AUTO: 1.5 %
LDH SERPL-CCNC: 196 U/L (ref 125–220)
LYMPHOCYTES # BLD AUTO: 1.64 X10(3)/MCL (ref 0.6–4.6)
LYMPHOCYTES NFR BLD AUTO: 10.9 %
MCH RBC QN AUTO: 26.5 PG
MCHC RBC AUTO-ENTMCNC: 32 MG/DL (ref 33–36)
MCV RBC AUTO: 83 FL (ref 80–94)
MONOCYTES # BLD AUTO: 0.82 X10(3)/MCL (ref 0.1–1.3)
MONOCYTES NFR BLD AUTO: 5.5 %
NEUTROPHILS # BLD AUTO: 12.25 X10(3)/MCL (ref 2.1–9.2)
NEUTROPHILS NFR BLD AUTO: 81.4 %
NRBC BLD AUTO-RTO: 0 %
PLATELET # BLD AUTO: 254 X10(3)/MCL (ref 130–400)
PMV BLD AUTO: 11.1 FL (ref 7.4–10.4)
POTASSIUM SERPL-SCNC: 4 MMOL/L (ref 3.5–5.1)
PROT SERPL-MCNC: 6.4 GM/DL (ref 6.4–8.3)
RBC # BLD AUTO: 4.41 X10(6)/MCL (ref 4.2–5.4)
SODIUM SERPL-SCNC: 135 MMOL/L (ref 136–145)
URATE SERPL-MCNC: 5.6 MG/DL (ref 2.6–6)
WBC # SPEC AUTO: 15 X10(3)/MCL (ref 4.5–11.5)

## 2023-01-16 PROCEDURE — 85025 COMPLETE CBC W/AUTO DIFF WBC: CPT | Performed by: OBSTETRICS & GYNECOLOGY

## 2023-01-16 PROCEDURE — 80053 COMPREHEN METABOLIC PANEL: CPT | Performed by: OBSTETRICS & GYNECOLOGY

## 2023-01-16 PROCEDURE — 83615 LACTATE (LD) (LDH) ENZYME: CPT | Performed by: OBSTETRICS & GYNECOLOGY

## 2023-01-16 PROCEDURE — 84550 ASSAY OF BLOOD/URIC ACID: CPT | Performed by: OBSTETRICS & GYNECOLOGY

## 2023-01-16 NOTE — DISCHARGE INSTRUCTIONS
return to hospital for bright red vaginal bleeding, regular contractions, you think your water broke, or not feeling baby move.   If you have a headache not relieved by tylenol, any vision changes such as seeing spots or blurry vision, come back in.

## 2023-01-23 DIAGNOSIS — O10.919 CHRONIC HYPERTENSION AFFECTING PREGNANCY: ICD-10-CM

## 2023-01-23 DIAGNOSIS — Z34.90 ENCOUNTER FOR INDUCTION OF LABOR: Primary | ICD-10-CM

## 2023-01-23 RX ORDER — MISOPROSTOL 100 UG/1
800 TABLET ORAL ONCE AS NEEDED
Status: CANCELLED | OUTPATIENT
Start: 2023-01-23

## 2023-01-23 RX ORDER — DIPHENOXYLATE HYDROCHLORIDE AND ATROPINE SULFATE 2.5; .025 MG/1; MG/1
1 TABLET ORAL 4 TIMES DAILY PRN
Status: CANCELLED | OUTPATIENT
Start: 2023-01-23

## 2023-01-23 RX ORDER — MISOPROSTOL 100 UG/1
800 TABLET ORAL ONCE AS NEEDED
Status: CANCELLED | OUTPATIENT
Start: 2023-01-23 | End: 2034-06-21

## 2023-01-23 RX ORDER — LIDOCAINE HYDROCHLORIDE 10 MG/ML
10 INJECTION INFILTRATION; PERINEURAL ONCE AS NEEDED
Status: CANCELLED | OUTPATIENT
Start: 2023-01-23 | End: 2034-06-21

## 2023-01-23 RX ORDER — OXYTOCIN/RINGER'S LACTATE 30/500 ML
334 PLASTIC BAG, INJECTION (ML) INTRAVENOUS ONCE
Status: CANCELLED | OUTPATIENT
Start: 2023-01-23 | End: 2023-01-23

## 2023-01-23 RX ORDER — CARBOPROST TROMETHAMINE 250 UG/ML
250 INJECTION, SOLUTION INTRAMUSCULAR
Status: CANCELLED | OUTPATIENT
Start: 2023-01-23

## 2023-01-23 RX ORDER — SIMETHICONE 80 MG
1 TABLET,CHEWABLE ORAL 4 TIMES DAILY PRN
Status: CANCELLED | OUTPATIENT
Start: 2023-01-23

## 2023-01-23 RX ORDER — SODIUM CHLORIDE, SODIUM LACTATE, POTASSIUM CHLORIDE, CALCIUM CHLORIDE 600; 310; 30; 20 MG/100ML; MG/100ML; MG/100ML; MG/100ML
INJECTION, SOLUTION INTRAVENOUS CONTINUOUS
Status: CANCELLED | OUTPATIENT
Start: 2023-01-23

## 2023-01-23 RX ORDER — METHYLERGONOVINE MALEATE 0.2 MG/ML
200 INJECTION INTRAVENOUS
Status: CANCELLED | OUTPATIENT
Start: 2023-01-23

## 2023-01-23 RX ORDER — ACETAMINOPHEN 325 MG/1
650 TABLET ORAL EVERY 6 HOURS PRN
Status: CANCELLED | OUTPATIENT
Start: 2023-01-23

## 2023-01-23 RX ORDER — PROCHLORPERAZINE EDISYLATE 5 MG/ML
5 INJECTION INTRAMUSCULAR; INTRAVENOUS EVERY 6 HOURS PRN
Status: CANCELLED | OUTPATIENT
Start: 2023-01-23

## 2023-01-23 RX ORDER — OXYTOCIN/RINGER'S LACTATE 30/500 ML
334 PLASTIC BAG, INJECTION (ML) INTRAVENOUS ONCE AS NEEDED
Status: CANCELLED | OUTPATIENT
Start: 2023-01-23 | End: 2034-06-21

## 2023-01-23 RX ORDER — OXYTOCIN/RINGER'S LACTATE 30/500 ML
0-30 PLASTIC BAG, INJECTION (ML) INTRAVENOUS CONTINUOUS
Status: CANCELLED | OUTPATIENT
Start: 2023-01-23

## 2023-01-23 RX ORDER — BUTORPHANOL TARTRATE 2 MG/ML
1 INJECTION INTRAMUSCULAR; INTRAVENOUS
Status: CANCELLED | OUTPATIENT
Start: 2023-01-23

## 2023-01-23 RX ORDER — OXYTOCIN 10 [USP'U]/ML
10 INJECTION, SOLUTION INTRAMUSCULAR; INTRAVENOUS ONCE AS NEEDED
Status: CANCELLED | OUTPATIENT
Start: 2023-01-23 | End: 2034-06-21

## 2023-01-23 RX ORDER — CALCIUM CARBONATE 200(500)MG
500 TABLET,CHEWABLE ORAL 3 TIMES DAILY PRN
Status: CANCELLED | OUTPATIENT
Start: 2023-01-23

## 2023-01-23 RX ORDER — OXYTOCIN/RINGER'S LACTATE 30/500 ML
95 PLASTIC BAG, INJECTION (ML) INTRAVENOUS ONCE
Status: CANCELLED | OUTPATIENT
Start: 2023-01-23 | End: 2023-01-23

## 2023-01-23 RX ORDER — MISOPROSTOL 100 UG/1
800 TABLET ORAL
Status: CANCELLED | OUTPATIENT
Start: 2023-01-23

## 2023-01-23 RX ORDER — BUTORPHANOL TARTRATE 2 MG/ML
2 INJECTION INTRAMUSCULAR; INTRAVENOUS
Status: CANCELLED | OUTPATIENT
Start: 2023-01-23

## 2023-01-23 RX ORDER — OXYTOCIN/RINGER'S LACTATE 30/500 ML
95 PLASTIC BAG, INJECTION (ML) INTRAVENOUS ONCE AS NEEDED
Status: CANCELLED | OUTPATIENT
Start: 2023-01-23 | End: 2034-06-21

## 2023-01-23 RX ORDER — ONDANSETRON 4 MG/1
8 TABLET, ORALLY DISINTEGRATING ORAL EVERY 8 HOURS PRN
Status: CANCELLED | OUTPATIENT
Start: 2023-01-23

## 2023-01-24 ENCOUNTER — ANESTHESIA (OUTPATIENT)
Dept: OBSTETRICS AND GYNECOLOGY | Facility: HOSPITAL | Age: 43
End: 2023-01-24
Payer: COMMERCIAL

## 2023-01-24 ENCOUNTER — ANESTHESIA EVENT (OUTPATIENT)
Dept: OBSTETRICS AND GYNECOLOGY | Facility: HOSPITAL | Age: 43
End: 2023-01-24
Payer: COMMERCIAL

## 2023-01-24 ENCOUNTER — HOSPITAL ENCOUNTER (INPATIENT)
Facility: HOSPITAL | Age: 43
LOS: 1 days | Discharge: HOME | End: 2023-01-25
Attending: OBSTETRICS & GYNECOLOGY | Admitting: OBSTETRICS & GYNECOLOGY
Payer: COMMERCIAL

## 2023-01-24 VITALS — RESPIRATION RATE: 16 BRPM

## 2023-01-24 DIAGNOSIS — O10.919 CHRONIC HYPERTENSION AFFECTING PREGNANCY: ICD-10-CM

## 2023-01-24 DIAGNOSIS — Z34.90 ENCOUNTER FOR INDUCTION OF LABOR: ICD-10-CM

## 2023-01-24 LAB
ALBUMIN SERPL-MCNC: 2.5 G/DL (ref 3.5–5)
ALBUMIN/GLOB SERPL: 0.7 RATIO (ref 1.1–2)
ALP SERPL-CCNC: 114 UNIT/L (ref 40–150)
ALT SERPL-CCNC: 18 UNIT/L (ref 0–55)
AST SERPL-CCNC: 17 UNIT/L (ref 5–34)
BASOPHILS # BLD AUTO: 0.05 X10(3)/MCL (ref 0–0.2)
BASOPHILS NFR BLD AUTO: 0.4 %
BILIRUBIN DIRECT+TOT PNL SERPL-MCNC: 0.3 MG/DL
BUN SERPL-MCNC: 12 MG/DL (ref 7–18.7)
CALCIUM SERPL-MCNC: 9 MG/DL (ref 8.4–10.2)
CHLORIDE SERPL-SCNC: 108 MMOL/L (ref 98–107)
CO2 SERPL-SCNC: 20 MMOL/L (ref 22–29)
CREAT SERPL-MCNC: 0.7 MG/DL (ref 0.55–1.02)
EOSINOPHIL # BLD AUTO: 0.11 X10(3)/MCL (ref 0–0.9)
EOSINOPHIL NFR BLD AUTO: 0.9 %
ERYTHROCYTE [DISTWIDTH] IN BLOOD BY AUTOMATED COUNT: 14.7 % (ref 11.5–17)
GFR SERPLBLD CREATININE-BSD FMLA CKD-EPI: >60 MLS/MIN/1.73/M2
GLOBULIN SER-MCNC: 3.5 GM/DL (ref 2.4–3.5)
GLUCOSE SERPL-MCNC: 87 MG/DL (ref 74–100)
GROUP & RH: NORMAL
HCT VFR BLD AUTO: 34.7 % (ref 37–47)
HGB BLD-MCNC: 11.2 GM/DL (ref 12–16)
IMM GRANULOCYTES # BLD AUTO: 0.21 X10(3)/MCL (ref 0–0.04)
IMM GRANULOCYTES NFR BLD AUTO: 1.7 %
INDIRECT COOMBS GEL: NORMAL
LYMPHOCYTES # BLD AUTO: 1.64 X10(3)/MCL (ref 0.6–4.6)
LYMPHOCYTES NFR BLD AUTO: 13.1 %
MCH RBC QN AUTO: 26.5 PG
MCHC RBC AUTO-ENTMCNC: 32.3 MG/DL (ref 33–36)
MCV RBC AUTO: 82.2 FL (ref 80–94)
MONOCYTES # BLD AUTO: 0.66 X10(3)/MCL (ref 0.1–1.3)
MONOCYTES NFR BLD AUTO: 5.3 %
NEUTROPHILS # BLD AUTO: 9.82 X10(3)/MCL (ref 2.1–9.2)
NEUTROPHILS NFR BLD AUTO: 78.6 %
NRBC BLD AUTO-RTO: 0 %
PLATELET # BLD AUTO: 248 X10(3)/MCL (ref 130–400)
PMV BLD AUTO: 11.2 FL (ref 7.4–10.4)
POTASSIUM SERPL-SCNC: 3.7 MMOL/L (ref 3.5–5.1)
PROT SERPL-MCNC: 6 GM/DL (ref 6.4–8.3)
RBC # BLD AUTO: 4.22 X10(6)/MCL (ref 4.2–5.4)
SODIUM SERPL-SCNC: 137 MMOL/L (ref 136–145)
T PALLIDUM AB SER QL: NONREACTIVE
WBC # SPEC AUTO: 12.5 X10(3)/MCL (ref 4.5–11.5)

## 2023-01-24 PROCEDURE — 63600175 PHARM REV CODE 636 W HCPCS: Performed by: ANESTHESIOLOGY

## 2023-01-24 PROCEDURE — 86780 TREPONEMA PALLIDUM: CPT | Performed by: OBSTETRICS & GYNECOLOGY

## 2023-01-24 PROCEDURE — 62326 NJX INTERLAMINAR LMBR/SAC: CPT | Performed by: ANESTHESIOLOGY

## 2023-01-24 PROCEDURE — 25000003 PHARM REV CODE 250: Performed by: OBSTETRICS & GYNECOLOGY

## 2023-01-24 PROCEDURE — 63600175 PHARM REV CODE 636 W HCPCS: Performed by: OBSTETRICS & GYNECOLOGY

## 2023-01-24 PROCEDURE — 25000003 PHARM REV CODE 250: Performed by: ANESTHESIOLOGY

## 2023-01-24 PROCEDURE — 72100002 HC LABOR CARE, 1ST 8 HOURS

## 2023-01-24 PROCEDURE — 80053 COMPREHEN METABOLIC PANEL: CPT | Performed by: OBSTETRICS & GYNECOLOGY

## 2023-01-24 PROCEDURE — 37000008 HC ANESTHESIA 1ST 15 MINUTES

## 2023-01-24 PROCEDURE — 11000001 HC ACUTE MED/SURG PRIVATE ROOM

## 2023-01-24 PROCEDURE — 85025 COMPLETE CBC W/AUTO DIFF WBC: CPT | Performed by: OBSTETRICS & GYNECOLOGY

## 2023-01-24 PROCEDURE — 72200005 HC VAGINAL DELIVERY LEVEL II

## 2023-01-24 PROCEDURE — 51702 INSERT TEMP BLADDER CATH: CPT

## 2023-01-24 PROCEDURE — 36415 COLL VENOUS BLD VENIPUNCTURE: CPT | Performed by: OBSTETRICS & GYNECOLOGY

## 2023-01-24 PROCEDURE — 86900 BLOOD TYPING SEROLOGIC ABO: CPT | Performed by: OBSTETRICS & GYNECOLOGY

## 2023-01-24 PROCEDURE — 37000009 HC ANESTHESIA EA ADD 15 MINS

## 2023-01-24 RX ORDER — PROCHLORPERAZINE EDISYLATE 5 MG/ML
5 INJECTION INTRAMUSCULAR; INTRAVENOUS EVERY 6 HOURS PRN
Status: DISCONTINUED | OUTPATIENT
Start: 2023-01-24 | End: 2023-01-25 | Stop reason: HOSPADM

## 2023-01-24 RX ORDER — OXYTOCIN/RINGER'S LACTATE 30/500 ML
95 PLASTIC BAG, INJECTION (ML) INTRAVENOUS ONCE AS NEEDED
Status: DISCONTINUED | OUTPATIENT
Start: 2023-01-24 | End: 2023-01-25 | Stop reason: HOSPADM

## 2023-01-24 RX ORDER — NALOXONE HCL 0.4 MG/ML
0.4 VIAL (ML) INJECTION SEE ADMIN INSTRUCTIONS
Status: DISCONTINUED | OUTPATIENT
Start: 2023-01-24 | End: 2023-01-25 | Stop reason: HOSPADM

## 2023-01-24 RX ORDER — SIMETHICONE 80 MG
1 TABLET,CHEWABLE ORAL 4 TIMES DAILY PRN
Status: DISCONTINUED | OUTPATIENT
Start: 2023-01-24 | End: 2023-01-25 | Stop reason: HOSPADM

## 2023-01-24 RX ORDER — MISOPROSTOL 100 UG/1
800 TABLET ORAL ONCE AS NEEDED
Status: DISCONTINUED | OUTPATIENT
Start: 2023-01-24 | End: 2023-01-25 | Stop reason: HOSPADM

## 2023-01-24 RX ORDER — SODIUM CITRATE AND CITRIC ACID MONOHYDRATE 334; 500 MG/5ML; MG/5ML
30 SOLUTION ORAL ONCE
Status: COMPLETED | OUTPATIENT
Start: 2023-01-24 | End: 2023-01-24

## 2023-01-24 RX ORDER — OXYTOCIN/RINGER'S LACTATE 30/500 ML
334 PLASTIC BAG, INJECTION (ML) INTRAVENOUS ONCE AS NEEDED
Status: DISCONTINUED | OUTPATIENT
Start: 2023-01-24 | End: 2023-01-25 | Stop reason: HOSPADM

## 2023-01-24 RX ORDER — ENOXAPARIN SODIUM 100 MG/ML
40 INJECTION SUBCUTANEOUS EVERY 12 HOURS
Status: DISCONTINUED | OUTPATIENT
Start: 2023-01-24 | End: 2023-01-25 | Stop reason: HOSPADM

## 2023-01-24 RX ORDER — LABETALOL HCL 20 MG/4 ML
40 SYRINGE (ML) INTRAVENOUS ONCE AS NEEDED
Status: COMPLETED | OUTPATIENT
Start: 2023-01-24 | End: 2023-01-24

## 2023-01-24 RX ORDER — LABETALOL HYDROCHLORIDE 5 MG/ML
20 INJECTION, SOLUTION INTRAVENOUS ONCE
Status: DISCONTINUED | OUTPATIENT
Start: 2023-01-24 | End: 2023-01-25 | Stop reason: HOSPADM

## 2023-01-24 RX ORDER — LIDOCAINE HYDROCHLORIDE 10 MG/ML
1 INJECTION, SOLUTION EPIDURAL; INFILTRATION; INTRACAUDAL; PERINEURAL ONCE
Status: DISCONTINUED | OUTPATIENT
Start: 2023-01-24 | End: 2023-01-25 | Stop reason: HOSPADM

## 2023-01-24 RX ORDER — SODIUM CHLORIDE 0.9 % (FLUSH) 0.9 %
10 SYRINGE (ML) INJECTION
Status: DISCONTINUED | OUTPATIENT
Start: 2023-01-24 | End: 2023-01-25 | Stop reason: HOSPADM

## 2023-01-24 RX ORDER — LABETALOL HCL 20 MG/4 ML
80 SYRINGE (ML) INTRAVENOUS ONCE AS NEEDED
Status: DISCONTINUED | OUTPATIENT
Start: 2023-01-24 | End: 2023-01-25 | Stop reason: HOSPADM

## 2023-01-24 RX ORDER — OXYTOCIN 10 [USP'U]/ML
10 INJECTION, SOLUTION INTRAMUSCULAR; INTRAVENOUS ONCE AS NEEDED
Status: DISCONTINUED | OUTPATIENT
Start: 2023-01-24 | End: 2023-01-25 | Stop reason: HOSPADM

## 2023-01-24 RX ORDER — DIPHENHYDRAMINE HYDROCHLORIDE 50 MG/ML
25 INJECTION INTRAMUSCULAR; INTRAVENOUS EVERY 4 HOURS PRN
Status: DISCONTINUED | OUTPATIENT
Start: 2023-01-24 | End: 2023-01-25 | Stop reason: HOSPADM

## 2023-01-24 RX ORDER — DOCUSATE SODIUM 100 MG/1
200 CAPSULE, LIQUID FILLED ORAL 2 TIMES DAILY PRN
Status: DISCONTINUED | OUTPATIENT
Start: 2023-01-24 | End: 2023-01-25 | Stop reason: HOSPADM

## 2023-01-24 RX ORDER — OXYTOCIN/RINGER'S LACTATE 30/500 ML
0-30 PLASTIC BAG, INJECTION (ML) INTRAVENOUS CONTINUOUS
Status: DISCONTINUED | OUTPATIENT
Start: 2023-01-24 | End: 2023-01-25 | Stop reason: HOSPADM

## 2023-01-24 RX ORDER — METHYLERGONOVINE MALEATE 0.2 MG/ML
200 INJECTION INTRAVENOUS
Status: DISCONTINUED | OUTPATIENT
Start: 2023-01-24 | End: 2023-01-25 | Stop reason: HOSPADM

## 2023-01-24 RX ORDER — HYDROCORTISONE 25 MG/G
CREAM TOPICAL 3 TIMES DAILY PRN
Status: DISCONTINUED | OUTPATIENT
Start: 2023-01-24 | End: 2023-01-25 | Stop reason: HOSPADM

## 2023-01-24 RX ORDER — PRENATAL WITH FERROUS FUM AND FOLIC ACID 3080; 920; 120; 400; 22; 1.84; 3; 20; 10; 1; 12; 200; 27; 25; 2 [IU]/1; [IU]/1; MG/1; [IU]/1; MG/1; MG/1; MG/1; MG/1; MG/1; MG/1; UG/1; MG/1; MG/1; MG/1; MG/1
1 TABLET ORAL DAILY
Status: DISCONTINUED | OUTPATIENT
Start: 2023-01-24 | End: 2023-01-25 | Stop reason: HOSPADM

## 2023-01-24 RX ORDER — BUTORPHANOL TARTRATE 2 MG/ML
2 INJECTION INTRAMUSCULAR; INTRAVENOUS
Status: DISCONTINUED | OUTPATIENT
Start: 2023-01-24 | End: 2023-01-25 | Stop reason: HOSPADM

## 2023-01-24 RX ORDER — FENTANYL/BUPIVACAINE/NS/PF 2-1250MCG
PLASTIC BAG, INJECTION (ML) INJECTION CONTINUOUS PRN
Status: DISCONTINUED | OUTPATIENT
Start: 2023-01-24 | End: 2023-01-24

## 2023-01-24 RX ORDER — ONDANSETRON 4 MG/1
8 TABLET, ORALLY DISINTEGRATING ORAL EVERY 8 HOURS PRN
Status: DISCONTINUED | OUTPATIENT
Start: 2023-01-24 | End: 2023-01-25 | Stop reason: HOSPADM

## 2023-01-24 RX ORDER — ACETAMINOPHEN 325 MG/1
650 TABLET ORAL EVERY 6 HOURS PRN
Status: DISCONTINUED | OUTPATIENT
Start: 2023-01-24 | End: 2023-01-24 | Stop reason: SDUPTHER

## 2023-01-24 RX ORDER — MISOPROSTOL 100 UG/1
800 TABLET ORAL
Status: DISCONTINUED | OUTPATIENT
Start: 2023-01-24 | End: 2023-01-25 | Stop reason: HOSPADM

## 2023-01-24 RX ORDER — HYDRALAZINE HYDROCHLORIDE 20 MG/ML
10 INJECTION INTRAMUSCULAR; INTRAVENOUS ONCE AS NEEDED
Status: DISCONTINUED | OUTPATIENT
Start: 2023-01-24 | End: 2023-01-25 | Stop reason: HOSPADM

## 2023-01-24 RX ORDER — SODIUM CHLORIDE, SODIUM LACTATE, POTASSIUM CHLORIDE, CALCIUM CHLORIDE 600; 310; 30; 20 MG/100ML; MG/100ML; MG/100ML; MG/100ML
INJECTION, SOLUTION INTRAVENOUS CONTINUOUS
Status: DISCONTINUED | OUTPATIENT
Start: 2023-01-24 | End: 2023-01-25 | Stop reason: HOSPADM

## 2023-01-24 RX ORDER — OXYTOCIN/RINGER'S LACTATE 30/500 ML
95 PLASTIC BAG, INJECTION (ML) INTRAVENOUS ONCE
Status: DISCONTINUED | OUTPATIENT
Start: 2023-01-24 | End: 2023-01-25 | Stop reason: HOSPADM

## 2023-01-24 RX ORDER — DIPHENHYDRAMINE HCL 25 MG
25 CAPSULE ORAL EVERY 4 HOURS PRN
Status: DISCONTINUED | OUTPATIENT
Start: 2023-01-24 | End: 2023-01-25 | Stop reason: HOSPADM

## 2023-01-24 RX ORDER — ACETAMINOPHEN 325 MG/1
650 TABLET ORAL EVERY 6 HOURS PRN
Status: DISCONTINUED | OUTPATIENT
Start: 2023-01-24 | End: 2023-01-25 | Stop reason: HOSPADM

## 2023-01-24 RX ORDER — DIPHENOXYLATE HYDROCHLORIDE AND ATROPINE SULFATE 2.5; .025 MG/1; MG/1
1 TABLET ORAL 4 TIMES DAILY PRN
Status: DISCONTINUED | OUTPATIENT
Start: 2023-01-24 | End: 2023-01-25 | Stop reason: HOSPADM

## 2023-01-24 RX ORDER — SIMETHICONE 80 MG
1 TABLET,CHEWABLE ORAL EVERY 6 HOURS PRN
Status: DISCONTINUED | OUTPATIENT
Start: 2023-01-24 | End: 2023-01-25 | Stop reason: HOSPADM

## 2023-01-24 RX ORDER — OXYTOCIN/RINGER'S LACTATE 30/500 ML
334 PLASTIC BAG, INJECTION (ML) INTRAVENOUS ONCE
Status: DISCONTINUED | OUTPATIENT
Start: 2023-01-24 | End: 2023-01-25 | Stop reason: HOSPADM

## 2023-01-24 RX ORDER — BUPIVACAINE HYDROCHLORIDE 2.5 MG/ML
INJECTION, SOLUTION EPIDURAL; INFILTRATION; INTRACAUDAL
Status: COMPLETED | OUTPATIENT
Start: 2023-01-24 | End: 2023-01-24

## 2023-01-24 RX ORDER — CALCIUM CARBONATE 200(500)MG
500 TABLET,CHEWABLE ORAL 3 TIMES DAILY PRN
Status: DISCONTINUED | OUTPATIENT
Start: 2023-01-24 | End: 2023-01-25 | Stop reason: HOSPADM

## 2023-01-24 RX ORDER — OXYCODONE AND ACETAMINOPHEN 10; 325 MG/1; MG/1
1 TABLET ORAL EVERY 4 HOURS PRN
Status: DISCONTINUED | OUTPATIENT
Start: 2023-01-24 | End: 2023-01-25 | Stop reason: HOSPADM

## 2023-01-24 RX ORDER — LABETALOL 200 MG/1
200 TABLET, FILM COATED ORAL 2 TIMES DAILY
Status: DISCONTINUED | OUTPATIENT
Start: 2023-01-24 | End: 2023-01-25 | Stop reason: HOSPADM

## 2023-01-24 RX ORDER — LIDOCAINE HYDROCHLORIDE 10 MG/ML
10 INJECTION INFILTRATION; PERINEURAL ONCE AS NEEDED
Status: DISCONTINUED | OUTPATIENT
Start: 2023-01-24 | End: 2023-01-25 | Stop reason: HOSPADM

## 2023-01-24 RX ORDER — OXYCODONE AND ACETAMINOPHEN 5; 325 MG/1; MG/1
1 TABLET ORAL EVERY 4 HOURS PRN
Status: DISCONTINUED | OUTPATIENT
Start: 2023-01-24 | End: 2023-01-25 | Stop reason: HOSPADM

## 2023-01-24 RX ORDER — LABETALOL HCL 20 MG/4 ML
20 SYRINGE (ML) INTRAVENOUS ONCE AS NEEDED
Status: COMPLETED | OUTPATIENT
Start: 2023-01-24 | End: 2023-01-24

## 2023-01-24 RX ORDER — BUTORPHANOL TARTRATE 2 MG/ML
1 INJECTION INTRAMUSCULAR; INTRAVENOUS
Status: DISCONTINUED | OUTPATIENT
Start: 2023-01-24 | End: 2023-01-25 | Stop reason: HOSPADM

## 2023-01-24 RX ORDER — CARBOPROST TROMETHAMINE 250 UG/ML
250 INJECTION, SOLUTION INTRAMUSCULAR
Status: DISCONTINUED | OUTPATIENT
Start: 2023-01-24 | End: 2023-01-25 | Stop reason: HOSPADM

## 2023-01-24 RX ORDER — EPHEDRINE SULFATE 50 MG/ML
10 INJECTION, SOLUTION INTRAVENOUS ONCE AS NEEDED
Status: DISCONTINUED | OUTPATIENT
Start: 2023-01-24 | End: 2023-01-25 | Stop reason: HOSPADM

## 2023-01-24 RX ORDER — IBUPROFEN 600 MG/1
600 TABLET ORAL EVERY 6 HOURS
Status: DISCONTINUED | OUTPATIENT
Start: 2023-01-24 | End: 2023-01-25 | Stop reason: HOSPADM

## 2023-01-24 RX ADMIN — BUPIVACAINE HYDROCHLORIDE 7 ML: 2.5 INJECTION, SOLUTION EPIDURAL; INFILTRATION; INTRACAUDAL; PERINEURAL at 07:01

## 2023-01-24 RX ADMIN — ENOXAPARIN SODIUM 40 MG: 40 INJECTION SUBCUTANEOUS at 11:01

## 2023-01-24 RX ADMIN — LABETALOL HYDROCHLORIDE 20 MG: 5 INJECTION, SOLUTION INTRAVENOUS at 05:01

## 2023-01-24 RX ADMIN — IBUPROFEN 600 MG: 600 TABLET, FILM COATED ORAL at 01:01

## 2023-01-24 RX ADMIN — LABETALOL HYDROCHLORIDE 200 MG: 200 TABLET, FILM COATED ORAL at 10:01

## 2023-01-24 RX ADMIN — SODIUM CITRATE AND CITRIC ACID MONOHYDRATE 30 ML: 500; 334 SOLUTION ORAL at 07:01

## 2023-01-24 RX ADMIN — Medication 2 MILLI-UNITS/MIN: at 05:01

## 2023-01-24 RX ADMIN — Medication 12 ML/HR: at 07:01

## 2023-01-24 RX ADMIN — LABETALOL HYDROCHLORIDE 200 MG: 200 TABLET, FILM COATED ORAL at 02:01

## 2023-01-24 RX ADMIN — SODIUM CHLORIDE, POTASSIUM CHLORIDE, SODIUM LACTATE AND CALCIUM CHLORIDE 1000 ML: 600; 310; 30; 20 INJECTION, SOLUTION INTRAVENOUS at 07:01

## 2023-01-24 RX ADMIN — IBUPROFEN 600 MG: 600 TABLET, FILM COATED ORAL at 07:01

## 2023-01-24 RX ADMIN — LABETALOL HYDROCHLORIDE 40 MG: 5 INJECTION, SOLUTION INTRAVENOUS at 06:01

## 2023-01-24 NOTE — PROGRESS NOTES
01/24/23 0754   Vital Signs   /75   MAP (mmHg) 94   Pulse 80   SpO2 99 %   Uterine Activity (PeriWATCH)   Monitor Mode External [place]   Uterine Tone (PeriWATCH)   Resting Tone (palp) Soft   Fetal Assessment (PeriWATCH)   Fetal Monitor Mode 1 Ultrasound   Baseline FHR 1 135   FA Characteristics 1 Normal     EFM and toco reapplied to soft, nontender abdomen after placement of PENELOPE

## 2023-01-24 NOTE — H&P
"   HISTORY AND PHYSICAL                                                OBSTETRICS          Subjective:      Jaylene Valencia is a 42 y.o.  female with IUP at 37w4d weeks gestation who presents to L&D for induction. Pertinent medical history for this pregnancy includes CHTN on meds, AMA and Obesity.  Care this pregnancy has been with Dr. Helton    PMHx:   Past Medical History:   Diagnosis Date    Essential (primary) hypertension        PSHx:   Past Surgical History:   Procedure Laterality Date    DILATION AND CURETTAGE OF UTERUS      DILATION AND CURETTAGE OF UTERUS      NO PAST SURGERIES         All:   Review of patient's allergies indicates:   Allergen Reactions    Penicillin g benzathine Rash       Meds:   Medications Prior to Admission   Medication Sig Dispense Refill Last Dose    labetaloL (NORMODYNE) 200 MG tablet Take 200 mg by mouth 2 (two) times daily.       pantoprazole (PROTONIX) 20 MG tablet Take by mouth.          SH:   Social History     Socioeconomic History    Marital status:    Tobacco Use    Smoking status: Never     Passive exposure: Never    Smokeless tobacco: Never   Substance and Sexual Activity    Alcohol use: Not Currently    Drug use: Never    Sexual activity: Yes     Partners: Male       FH:   Family History   Problem Relation Age of Onset    Hypertension Mother     Arrhythmia Mother     Hypertension Father        OBHx:   OB History    Para Term  AB Living   7 4 4 0 2 4   SAB IAB Ectopic Multiple Live Births   2 0 0 0 4      # Outcome Date GA Lbr Ramiro/2nd Weight Sex Delivery Anes PTL Lv   7 Current            6 SAB 22     SAB      5 Term 02/23/15    F Vag-Spont   RAEGAN   4 Term 13    M Vag-Spont   RAEGAN   3 Term 11    F Vag-Spont   RAEGAN   2 Term 09    F Vag-Spont   RAEGAN   1 SAB 2008     SAB          Objective:      /82   Pulse 81   Temp 97.8 °F (36.6 °C) (Oral)   Resp 15   Ht 5' 3" (1.6 m)   Wt 127 kg (280 lb)   " Breastfeeding No   BMI 49.60 kg/m²   Body mass index is 49.6 kg/m².    General:   alert and cooperative   HEENT:  normocephalic, atraumatic   Lungs:   clear to auscultation bilaterally   Heart:   regular rate and rhythm, S1, S2 normal   Abdomen:  gravid, non-tender   Extremities non-tender, no edema   Derm: no rashes or lesions   Psych: appropriate mood and affect   Pelvis:  adequate         Lab Review  GBS: negative      Assessment:     42 y.o.  at 37w4d weeks gestation.  CHTN on meds  AMA  Obesity  Induction    There are no hospital problems to display for this patient.         Plan:     1. Risks, benefits, alternatives and possible complications have been discussed in detail with the patient. All questions have been answered, and Ms. Valencia has voiced understanding and agrees to the treatment plan.  2. Consents signed and in chart  3. Admit to Labor and Delivery unit  4. Pitocin induction

## 2023-01-24 NOTE — ANESTHESIA PROCEDURE NOTES
Epidural    Patient location during procedure: OB   Reason for block: primary anesthetic   Reason for block: labor analgesia requested by patient and obstetrician  Diagnosis: IUP in labor   Start time: 1/24/2023 7:32 AM  Timeout: 1/24/2023 7:30 AM  End time: 1/24/2023 7:50 AM    Staffing  Performing Provider: Marcus Cristobal Jr., MD  Authorizing Provider: Marcus Cristobal Jr., MD        Preanesthetic Checklist  Completed: patient identified, IV checked, site marked, risks and benefits discussed, surgical consent, monitors and equipment checked, pre-op evaluation, timeout performed, anesthesia consent given, hand hygiene performed and patient being monitored  Preparation  Patient position: sitting  Prep: ChloraPrep  Patient monitoring: Pulse Ox and Blood Pressure  Reason for block: primary anesthetic   Epidural  Skin Anesthetic: lidocaine 1%  Skin Wheal: 4 mL  Administration type: continuous  Approach: midline  Interspace: L2-3    Injection technique: PEGGY air and PEGGY saline  Needle and Epidural Catheter  Needle type: Tuohy   Needle gauge: 17  Needle length: 3.5 inches  Catheter type: [place]  Catheter size: 19 G  Insertion Attempts: 1  Test dose: 3 mL of lidocaine 1.5% with Epi 1-to-200,000  Additional Documentation: incremental injection, no paresthesia on injection, no significant pain on injection, negative aspiration for heme and CSF, no signs/symptoms of IV or SA injection and no significant complaints from patient  Needle localization: anatomical landmarks  Assessment  Upper dermatomal levels - Left: T8  Right: T8   Dermatomal levels determined by ice  Ease of block: difficult  Patient's tolerance of the procedure: comfortable throughout block and no complaints  Additional Notes    Attempts @L3-4 (+heme via Touhy) & L4-5 unable to locate interspace    Postdural puncture w/ 5in 25g [place]    Initial Epidrual bolus diluted w/ 7cc PFNS No inadvertent dural puncture with Tuohy.  Dural puncture performed with  spinal needle.      Medications:    Medications: bupivacaine (pf) (MARCAINE) injection 0.25% - Epidural   7 mL - 1/24/2023 7:50:00 AM

## 2023-01-24 NOTE — PROGRESS NOTES
Pt assisted up to the bathroom after delivery. Void x1, sapphire care performed and pads applied, steady gait noted.

## 2023-01-24 NOTE — PLAN OF CARE
Problem: Adult Inpatient Plan of Care  Goal: Plan of Care Review  Outcome: Ongoing, Progressing  Goal: Patient-Specific Goal (Individualized)  Outcome: Ongoing, Progressing  Goal: Absence of Hospital-Acquired Illness or Injury  Outcome: Ongoing, Progressing  Goal: Optimal Comfort and Wellbeing  Outcome: Ongoing, Progressing  Goal: Readiness for Transition of Care  Outcome: Ongoing, Progressing     Problem: Bariatric Environmental Safety  Goal: Safety Maintained with Care  Outcome: Ongoing, Progressing     Problem: Infection  Goal: Absence of Infection Signs and Symptoms  Outcome: Ongoing, Progressing     Problem:  Fall Injury Risk  Goal: Absence of Fall, Infant Drop and Related Injury  Outcome: Ongoing, Progressing     Problem: Adjustment to Role Transition (Postpartum Vaginal Delivery)  Goal: Successful Maternal Role Transition  Outcome: Ongoing, Progressing     Problem: Bleeding (Postpartum Vaginal Delivery)  Goal: Hemostasis  Outcome: Ongoing, Progressing     Problem: Infection (Postpartum Vaginal Delivery)  Goal: Absence of Infection Signs/Symptoms  Outcome: Ongoing, Progressing     Problem: Pain (Postpartum Vaginal Delivery)  Goal: Acceptable Pain Control  Outcome: Ongoing, Progressing     Problem: Urinary Retention (Postpartum Vaginal Delivery)  Goal: Effective Urinary Elimination  Outcome: Ongoing, Progressing

## 2023-01-24 NOTE — L&D DELIVERY NOTE
[facility] - Labor and Delivery  Vaginal Delivery   Operative Note    SUMMARY     Normal spontaneous vaginal delivery of live infant, was placed on mothers abdomen for skin to skin and bulb suctioning performed.  Infant delivered position OA over intact perineum.  Nuchal cord: Yes, cord reduced at perineum.    Spontaneous delivery of placenta and IV pitocin given noting good uterine tone.  No lacerations noted.  Patient tolerated delivery well. Sponge needle and lap counted correctly x2.    Indications: Vaginal delivery  Pregnancy complicated by:   Patient Active Problem List   Diagnosis    Elevated blood pressure reading    Obesity affecting pregnancy in third trimester    Chronic hypertension affecting pregnancy     labor    Vaginal delivery     Admitting GA: 37w4d    Delivery Information for Dasha Valencia    Birth information:  YOB: 2021   Time of birth: 10:52 AM   Sex: female   Head Delivery Date/Time: 2023 10:52 AM   Delivery type: Vaginal, Spontaneous   Gestational Age: 37w4d    Delivery Providers    Delivering clinician: Lorie Helton MD   Provider Role    Ashlyn Yanes RN Registered Nurse    Candi Zarco LPN Licensed Practical Nurse    Manuela Bowen RN Registered Nurse              Measurements    Weight:   Length:          Apgars    Living status: Living  Apgars:  1 min.:  5 min.:  10 min.:  15 min.:  20 min.:    Skin color:  0  1       Heart rate:  2  2       Reflex irritability:  2  2       Muscle tone:  2  2       Respiratory effort:  2  2       Total:  8  9       Apgars assigned by: NILAY ZARCO LPN         Operative Delivery    Forceps attempted?: No  Vacuum extractor attempted?: No         Shoulder Dystocia    Shoulder dystocia present?: No           Presentation    Presentation: Vertex  Position: Middle Occiput Anterior           Interventions/Resuscitation    Method: Bulb Suctioning, Tactile Stimulation, Deep Suctioning       Cord    Vessels: 3  vessels  Complications: Nuchal  Nuchal Intervention: reduced  Nuchal Cord Description: loose nuchal cord  Number of Loops: 1  Delayed Cord Clamping?: No  Cord Blood Disposition: Sent with Baby  Gases Sent?: No  Stem Cell Collection (by MD): No       Placenta    Placenta delivery date/time: 2023 1054  Placenta removal: Spontaneous  Placenta appearance: Intact  Placenta disposition: discarded           Labor Events:       labor: No     Labor Onset Date/Time: 2023 07:30     Dilation Complete Date/Time: 2023 10:38     Start Pushing Date/Time: 2023 10:40     Rupture Date/Time: 23  0817         Rupture type: ARM (Artificial Rupture)           Fluid Amount:         Fluid Color: Clear         steroids: None     Antibiotics given for GBS: No     Induction: oxytocin     Indications for induction:  Hypertension     Augmentation: amniotomy     Indications for augmentation: Ineffective Contraction Pattern     Labor complications: None     Additional complications:          Cervical ripening:                     Delivery:      Episiotomy: None     Indication for Episiotomy:       Perineal Lacerations: None Repaired:      Periurethral Laceration:   Repaired:     Labial Laceration:   Repaired:     Sulcus Laceration:   Repaired:     Vaginal Laceration:   Repaired:     Cervical Laceration:   Repaired:     Repair suture: None     Repair # of packets:       Last Value - EBL - Nursing (mL):       Sum - EBL - Nursing (mL): 0     Last Value - EBL - Anesthesia (mL):        Calculated QBL (mL):         Vaginal Sweep Performed: No     Surgicount Correct: Yes       Other providers:       Anesthesia    Method: Epidural          Details (if applicable):  Trial of Labor      Categorization:      Priority:     Indications for :     Incision Type:       Additional  information:  Forceps:    Vacuum:    Breech:    Observed anomalies    Other (Comments):

## 2023-01-24 NOTE — PROGRESS NOTES
01/24/23 0727   Uterine Activity (PeriWATCH)   Monitor Mode External [place]   Contraction Frequency (min) 0   Contractions per 10 Minutes 0   Uterine Tone (PeriWATCH)   Resting Tone (palp) Soft   Fetal Assessment (PeriWATCH)   Fetal Monitor Mode 1 Ultrasound   Baseline FHR 1 130   Variability 1 Moderate   Accels 1 Present   Decels 1 None   Rhythm 1  Regular   FA Characteristics 1 Normal     EFM and toco removed from soft, nontender abdomen for placement of PENELOPE

## 2023-01-24 NOTE — ANESTHESIA PREPROCEDURE EVALUATION
01/24/2023  Jaylene Valencia is a 42 y.o., female IUP @5cm 5/10 pain.    Last 3 sets of Vitals    Vitals - 1 value per visit 1/24/2023 1/24/2023 1/24/2023   SYSTOLIC - 127 -   DIASTOLIC - 78 -   Pulse 89 91 89   Temp - - -   Resp 16 - -   SPO2 99 - -   Weight (lb) - - -   Weight (kg) - - -   Height - - -   BMI (Calculated) - - -   VISIT REPORT - - -         Lab Results   Component Value Date    WBC 12.5 (H) 01/24/2023    HGB 11.2 (L) 01/24/2023    HCT 34.7 (L) 01/24/2023    MCV 82.2 01/24/2023     01/24/2023          BMP  Lab Results   Component Value Date     (L) 01/16/2023    K 4.0 01/16/2023    CO2 16 (L) 01/16/2023    BUN 12.4 01/16/2023    CREATININE 0.65 01/16/2023    CALCIUM 9.2 01/16/2023    EGFRNONAA >60 03/04/2022      Pre-op Assessment    I have reviewed the Patient Summary Reports.     I have reviewed the Nursing Notes. I have reviewed the NPO Status.   I have reviewed the Medications.     Review of Systems  Anesthesia Hx:  No problems with previous Anesthesia  History of prior surgery of interest to airway management or planning:   Cardiovascular:   Hypertension  Functional Capacity good / => 4 METS    Pulmonary:  Pulmonary Normal    OB/GYN/PEDS:  Planned Vaginal Delivery    Neurological:  Neurology Normal    Endocrine:  Morbid Obesity / BMI > 40      Physical Exam  General: Well nourished, Cooperative, Alert and Oriented    Airway:  Mallampati: III   Mouth Opening: Normal  TM Distance: Normal  Tongue: Normal  Neck ROM: Normal ROM    Dental:  Intact    Chest/Lungs:  Clear to auscultation, Normal Respiratory Rate    Heart:  Rate: Normal  Rhythm: Regular Rhythm        Anesthesia Plan  Type of Anesthesia, risks & benefits discussed:    Anesthesia Type: Epidural  Post Op Pain Control Plan: epidural analgesia  Informed Consent: Informed consent signed with the Patient and all parties  understand the risks and agree with anesthesia plan.  All questions answered.   ASA Score: 3  Day of Surgery Review of History & Physical: H&P Update referred to the surgeon/provider.    Ready For Surgery From Anesthesia Perspective.     .

## 2023-01-25 VITALS
TEMPERATURE: 98 F | WEIGHT: 280 LBS | OXYGEN SATURATION: 100 % | HEART RATE: 90 BPM | RESPIRATION RATE: 18 BRPM | DIASTOLIC BLOOD PRESSURE: 65 MMHG | BODY MASS INDEX: 49.61 KG/M2 | SYSTOLIC BLOOD PRESSURE: 111 MMHG | HEIGHT: 63 IN

## 2023-01-25 PROCEDURE — 63600175 PHARM REV CODE 636 W HCPCS: Performed by: OBSTETRICS & GYNECOLOGY

## 2023-01-25 PROCEDURE — 25000003 PHARM REV CODE 250: Performed by: OBSTETRICS & GYNECOLOGY

## 2023-01-25 RX ADMIN — IBUPROFEN 600 MG: 600 TABLET, FILM COATED ORAL at 02:01

## 2023-01-25 RX ADMIN — PRENATAL VITAMINS-IRON FUMARATE 27 MG IRON-FOLIC ACID 0.8 MG TABLET 1 TABLET: at 08:01

## 2023-01-25 RX ADMIN — IBUPROFEN 600 MG: 600 TABLET, FILM COATED ORAL at 01:01

## 2023-01-25 RX ADMIN — ENOXAPARIN SODIUM 40 MG: 40 INJECTION SUBCUTANEOUS at 08:01

## 2023-01-25 RX ADMIN — IBUPROFEN 600 MG: 600 TABLET, FILM COATED ORAL at 08:01

## 2023-01-25 RX ADMIN — LABETALOL HYDROCHLORIDE 200 MG: 200 TABLET, FILM COATED ORAL at 08:01

## 2023-01-25 NOTE — PLAN OF CARE
Problem: Adult Inpatient Plan of Care  Goal: Plan of Care Review  Outcome: Ongoing, Progressing  Goal: Patient-Specific Goal (Individualized)  Outcome: Ongoing, Progressing  Goal: Absence of Hospital-Acquired Illness or Injury  Outcome: Ongoing, Progressing  Goal: Optimal Comfort and Wellbeing  Outcome: Ongoing, Progressing  Goal: Readiness for Transition of Care  Outcome: Ongoing, Progressing     Problem: Bariatric Environmental Safety  Goal: Safety Maintained with Care  Outcome: Ongoing, Progressing     Problem: Infection  Goal: Absence of Infection Signs and Symptoms  Outcome: Ongoing, Progressing     Problem:  Fall Injury Risk  Goal: Absence of Fall, Infant Drop and Related Injury  Outcome: Ongoing, Progressing     Problem: Adjustment to Role Transition (Postpartum Vaginal Delivery)  Goal: Successful Maternal Role Transition  Outcome: Ongoing, Progressing     Problem: Bleeding (Postpartum Vaginal Delivery)  Goal: Hemostasis  Outcome: Ongoing, Progressing     Problem: Infection (Postpartum Vaginal Delivery)  Goal: Absence of Infection Signs/Symptoms  Outcome: Ongoing, Progressing     Problem: Pain (Postpartum Vaginal Delivery)  Goal: Acceptable Pain Control  Outcome: Ongoing, Progressing     Problem: Urinary Retention (Postpartum Vaginal Delivery)  Goal: Effective Urinary Elimination  Outcome: Ongoing, Progressing     Patient doing as expected at this time.

## 2023-01-25 NOTE — DISCHARGE SUMMARY
Delivery Discharge Summary  Obstetrics      Primary OB Clinician: Lorie Helton MD    Discharge Provider: Nakita Wilson MD    Admission date: 2023  Discharge date: 2023    Admit Dx:   Discharge Dx:    Patient Active Problem List   Diagnosis    Elevated blood pressure reading    Obesity affecting pregnancy in third trimester    Chronic hypertension affecting pregnancy     labor    Vaginal delivery       Procedure:     Hospital Course:  Jaylene Valencia is a 42 y.o. now  who was admitted on 2023 for delivery. Patient delivered a viable . Please see delivery note for further details. Pt was in stable condition post delivery and was transferred to the Mother-Baby Unit. Her postpartum course was uncomplicated. On the date of discharge, patient's pain is controlled with oral pain medications. She is tolerating ambulation without SOB or CP, and PO diet without N/V. Reported lochia is within the normal range. Pt in stable condition and ready for discharge.     Pertinent studies:  Postpartum CBC  Lab Results   Component Value Date    WBC 12.5 (H) 2023    HGB 11.2 (L) 2023    HCT 34.7 (L) 2023    MCV 82.2 2023     2023       Delivery:    Episiotomy: None   Lacerations: None   Repair suture: None   Repair # of packets:     Blood loss (ml):       Birth information:  YOB: 2021   Time of birth: 10:52 AM   Sex: female   Delivery type: Vaginal, Spontaneous   Gestational Age: 37w4d    Delivery Clinician:      Other providers:       Additional  information:  Forceps:    Vacuum:    Breech:    Observed anomalies      Living?:           APGARS  One minute Five minutes Ten minutes   Skin color:         Heart rate:         Grimace:         Muscle tone:         Breathing:         Totals: 8  9        Placenta: Delivered:       appearance    Disposition: To home, self care    Follow Up: 6 weeks    Patient Instructions:   1. Call the office  for any bleeding >2 pads/hour for >2 hours, temperature >100.4, pain that is uncontrolled with medications, or for any other concerns.  2. Pelvic rest and no tub baths x 6 weeks.  3. No driving while on narcotics.    Current Discharge Medication List        CONTINUE these medications which have NOT CHANGED    Details   labetaloL (NORMODYNE) 200 MG tablet Take 200 mg by mouth 2 (two) times daily.      pantoprazole (PROTONIX) 20 MG tablet Take by mouth.             Nakita Wilson

## 2023-01-25 NOTE — PLAN OF CARE
Problem: Breastfeeding  Goal: Effective Breastfeeding  Outcome: Ongoing, Progressing  Intervention: Promote Effective Breastfeeding  Flowsheets (Taken 1/25/2023 0500)  Breastfeeding Assistance:   support offered   feeding on demand promoted  Parent/Child Attachment Promotion:   positive reinforcement provided   strengths emphasized  Intervention: Support Exclusive Breastfeeding Success  Flowsheets (Taken 1/25/2023 0500)  Supportive Measures:   active listening utilized   counseling provided   decision-making supported   verbalization of feelings encouraged  Breastfeeding Support:   encouragement provided   lactation counseling provided

## 2023-01-25 NOTE — PROGRESS NOTES
PostPartum Progress Note        Subjective:      Post-Partum Day #1 after uncomplicated vaginal delivery.    Patient is without complaints. Lochia decreasing. Breast feeding. Pain is well controlled. Patient is ambulating. Tolerating Full Regular diet.Overall mother and baby are doing well.     Objective:      Temp:  [97.8 °F (36.6 °C)-98.7 °F (37.1 °C)] 97.9 °F (36.6 °C)  Pulse:  [] 90  Resp:  [18] 18  SpO2:  [100 %] 100 %  BP: (111-160)/(55-80) 111/65    Intake/Output Summary (Last 24 hours) at 2023 0910  Last data filed at 2023 1055  Gross per 24 hour   Intake --   Output 550 ml   Net -550 ml     Body mass index is 49.6 kg/m².    General: no acute distress  Abdomen: soft, non-tender, non-distended; Fundus firm and at the umbilicus  Perineum intact  Extremities: non-tender, symmetric, trace edema    Group & Rh   Date Value Ref Range Status   2023 AB NEG  Final     Recent Results (from the past 336 hour(s))   CBC with Differential    Collection Time: 23  5:03 AM   Result Value Ref Range    WBC 12.5 (H) 4.5 - 11.5 x10(3)/mcL    Hgb 11.2 (L) 12.0 - 16.0 gm/dL    Hct 34.7 (L) 37.0 - 47.0 %    Platelet 248 130 - 400 x10(3)/mcL   CBC with Differential    Collection Time: 23  4:32 PM   Result Value Ref Range    WBC 15.0 (H) 4.5 - 11.5 x10(3)/mcL    Hgb 11.7 (L) 12.0 - 16.0 gm/dL    Hct 36.6 (L) 37.0 - 47.0 %    Platelet 254 130 - 400 x10(3)/mcL          Assessment:     42 y.o.  S/P  Post-Partum Day #1  - Doing Well      Plan:     1. Continue routine postpartum care  2. Plan for D/C today

## 2023-01-26 ENCOUNTER — PATIENT OUTREACH (OUTPATIENT)
Dept: ADMINISTRATIVE | Facility: CLINIC | Age: 43
End: 2023-01-26
Payer: COMMERCIAL

## 2023-01-26 ENCOUNTER — PATIENT MESSAGE (OUTPATIENT)
Dept: ADMINISTRATIVE | Facility: CLINIC | Age: 43
End: 2023-01-26
Payer: COMMERCIAL

## 2023-01-26 NOTE — ANESTHESIA POSTPROCEDURE EVALUATION
Anesthesia Post Evaluation    Patient: Jaylene Valencia    Procedure(s) Performed: * No procedures listed *    Final Anesthesia Type: general (/Regional//MAC)      Patient location during evaluation: PACU  Post-procedure mental status: @ basline.  Post-procedure vital signs: reviewed and stable  Pain management: adequate    PONV status: See postop meds for drugs used to control n/v if any.  Anesthetic complications: no      Cardiovascular status: blood pressure returned to baseline  Respiratory status: @ baseline.  Hydration status: euvolemic      No complaints at this time.        Vitals Value Taken Time   /65 01/25/23 0807   Temp 36.6 °C (97.9 °F) 01/25/23 0807   Pulse 90 01/25/23 0807   Resp 18 01/25/23 0807   SpO2 100 % 01/24/23 1049         No case tracking events are documented in the log.      Pain/Elia Score: Pain Rating Prior to Med Admin: 0 (1/25/2023  2:19 PM)

## 2023-01-27 RX ORDER — IBUPROFEN 200 MG
400 TABLET ORAL
COMMUNITY

## 2023-01-27 NOTE — PROGRESS NOTES
C3 nurse attempted to contact Jaylene Valencia for a TCC post hospital discharge follow up call. No answer, left VM, CB# provided, pt portal msg also sent.  The patient does not have a scheduled HOSFU appointment that I can locate.       
C3 nurse spoke with Jaylene Valencia  for a TCC post hospital discharge follow up call. The patient does not have a scheduled HOSFU appointment with Dr. Helton. Stated she will call office and schedule hospital follow up appointment.         
Nursing

## 2023-02-24 ENCOUNTER — PATIENT MESSAGE (OUTPATIENT)
Dept: RESEARCH | Facility: HOSPITAL | Age: 43
End: 2023-02-24
Payer: COMMERCIAL

## 2023-03-17 ENCOUNTER — PATIENT MESSAGE (OUTPATIENT)
Dept: RESEARCH | Facility: HOSPITAL | Age: 43
End: 2023-03-17
Payer: COMMERCIAL

## 2023-06-26 ENCOUNTER — HOSPITAL ENCOUNTER (OUTPATIENT)
Dept: RADIOLOGY | Facility: HOSPITAL | Age: 43
Discharge: HOME | End: 2023-06-26
Attending: NURSE PRACTITIONER
Payer: COMMERCIAL

## 2023-06-26 DIAGNOSIS — Z12.31 BREAST CANCER SCREENING BY MAMMOGRAM: ICD-10-CM

## 2023-06-26 PROCEDURE — 77067 MAMMO DIGITAL SCREENING BILAT WITH TOMO: ICD-10-PCS | Mod: 26,,, | Performed by: RADIOLOGY

## 2023-06-26 PROCEDURE — 77063 BREAST TOMOSYNTHESIS BI: CPT | Mod: 26,,, | Performed by: RADIOLOGY

## 2023-06-26 PROCEDURE — 77067 SCR MAMMO BI INCL CAD: CPT | Mod: 26,,, | Performed by: RADIOLOGY

## 2023-06-26 PROCEDURE — 77067 SCR MAMMO BI INCL CAD: CPT | Mod: TC

## 2023-06-26 PROCEDURE — 77063 MAMMO DIGITAL SCREENING BILAT WITH TOMO: ICD-10-PCS | Mod: 26,,, | Performed by: RADIOLOGY

## 2024-04-30 ENCOUNTER — OFFICE VISIT (OUTPATIENT)
Dept: URGENT CARE | Facility: CLINIC | Age: 44
End: 2024-04-30
Payer: COMMERCIAL

## 2024-04-30 VITALS
RESPIRATION RATE: 16 BRPM | SYSTOLIC BLOOD PRESSURE: 130 MMHG | TEMPERATURE: 98 F | HEIGHT: 63 IN | OXYGEN SATURATION: 99 % | BODY MASS INDEX: 49.61 KG/M2 | WEIGHT: 280 LBS | DIASTOLIC BLOOD PRESSURE: 84 MMHG | HEART RATE: 79 BPM

## 2024-04-30 DIAGNOSIS — H66.90 OTITIS MEDIA, UNSPECIFIED LATERALITY, UNSPECIFIED OTITIS MEDIA TYPE: Primary | ICD-10-CM

## 2024-04-30 PROCEDURE — 96372 THER/PROPH/DIAG INJ SC/IM: CPT | Mod: ,,, | Performed by: FAMILY MEDICINE

## 2024-04-30 PROCEDURE — 99203 OFFICE O/P NEW LOW 30 MIN: CPT | Mod: 25,,, | Performed by: FAMILY MEDICINE

## 2024-04-30 RX ORDER — BETAMETHASONE SODIUM PHOSPHATE AND BETAMETHASONE ACETATE 3; 3 MG/ML; MG/ML
12 INJECTION, SUSPENSION INTRA-ARTICULAR; INTRALESIONAL; INTRAMUSCULAR; SOFT TISSUE
Status: COMPLETED | OUTPATIENT
Start: 2024-04-30 | End: 2024-04-30

## 2024-04-30 RX ORDER — CEFDINIR 300 MG/1
300 CAPSULE ORAL 2 TIMES DAILY
Qty: 20 CAPSULE | Refills: 0 | Status: SHIPPED | OUTPATIENT
Start: 2024-04-30 | End: 2024-05-10

## 2024-04-30 RX ADMIN — BETAMETHASONE SODIUM PHOSPHATE AND BETAMETHASONE ACETATE 12 MG: 3; 3 INJECTION, SUSPENSION INTRA-ARTICULAR; INTRALESIONAL; INTRAMUSCULAR; SOFT TISSUE at 09:04

## 2024-04-30 NOTE — PROGRESS NOTES
"Subjective:      Patient ID: Jaylene Valencia is a 43 y.o. female.    Vitals:  height is 5' 3" (1.6 m) and weight is 127 kg (280 lb). Her temperature is 97.5 °F (36.4 °C). Her blood pressure is 130/84 and her pulse is 79. Her respiration is 16 and oxygen saturation is 99%.     Chief Complaint: Ear Problem     Patient is a 43 y.o. female who presents to urgent care with complaints of left ear pain, sinus pressure and headache x2 weeks. Alleviating factors include sudafed and advil with mild amount of relief. Patient denies fever.  Denies any drainage from the ear      Constitution: Negative.   HENT:  Positive for ear pain.    Cardiovascular: Negative.    Eyes: Negative.    Respiratory: Negative.     Gastrointestinal: Negative.    Genitourinary: Negative.    Musculoskeletal: Negative.    Skin: Negative.    Allergic/Immunologic: Negative.    Neurological: Negative.    Hematologic/Lymphatic: Negative.       Objective:     Physical Exam   Constitutional: She is oriented to person, place, and time. She appears well-developed. She is cooperative.  Non-toxic appearance. She does not appear ill. No distress.   HENT:   Head: Normocephalic and atraumatic.   Ears:   Right Ear: Hearing and external ear normal. no impacted cerumen (scant amount effusion right TM)  Left Ear: Hearing and external ear normal. no impacted cerumen (TM erythemic and bulging)  Mouth/Throat: Oropharynx is clear and moist and mucous membranes are normal. No oropharyngeal exudate or posterior oropharyngeal erythema (postnasal drip).   Eyes: Conjunctivae and lids are normal.   Neck: Trachea normal and phonation normal. Neck supple. No edema present. No erythema present. No neck rigidity present.   Cardiovascular: Normal rate.   Pulmonary/Chest: Effort normal and breath sounds normal. No stridor. No respiratory distress. She has no decreased breath sounds. She has no wheezes. She has no rhonchi. She has no rales.   Abdominal: Normal appearance. " "  Lymphadenopathy:     She has no cervical adenopathy.   Neurological: She is alert and oriented to person, place, and time. She exhibits normal muscle tone. Coordination normal.   Skin: Skin is warm, dry, intact, not diaphoretic and no rash.   Psychiatric: Her speech is normal and behavior is normal. Mood, judgment and thought content normal.   Nursing note and vitals reviewed.       Previous History      Review of patient's allergies indicates:   Allergen Reactions    Penicillin g benzathine Rash       Past Medical History:   Diagnosis Date    Essential (primary) hypertension      Current Outpatient Medications   Medication Instructions    cefdinir (OMNICEF) 300 mg, Oral, 2 times daily    ibuprofen (ADVIL,MOTRIN) 400 mg, As needed (PRN)    labetaloL (NORMODYNE) 200 mg, 2 times daily    pantoprazole (PROTONIX) 20 MG tablet Take by mouth.     Past Surgical History:   Procedure Laterality Date    DILATION AND CURETTAGE OF UTERUS  2022    DILATION AND CURETTAGE OF UTERUS  2008    NO PAST SURGERIES       Family History   Problem Relation Name Age of Onset    Hypertension Mother      Arrhythmia Mother      Hypertension Father         Social History     Tobacco Use    Smoking status: Never     Passive exposure: Never    Smokeless tobacco: Never   Substance Use Topics    Alcohol use: Not Currently    Drug use: Never        Physical Exam      Vital Signs Reviewed   /84   Pulse 79   Temp 97.5 °F (36.4 °C)   Resp 16   Ht 5' 3" (1.6 m)   Wt 127 kg (280 lb)   SpO2 99%   Breastfeeding No   BMI 49.60 kg/m²        Procedures    Procedures     Labs     Results for orders placed or performed during the hospital encounter of 01/24/23   Ob Cervical Screen   Result Value Ref Range    N gonorrhoeae, amplified DNA Negative    Comprehensive metabolic panel   Result Value Ref Range    Sodium Level 137 136 - 145 mmol/L    Potassium Level 3.7 3.5 - 5.1 mmol/L    Chloride 108 (H) 98 - 107 mmol/L    Carbon Dioxide 20 (L) 22 - 29 " mmol/L    Glucose Level 87 74 - 100 mg/dL    Blood Urea Nitrogen 12.0 7.0 - 18.7 mg/dL    Creatinine 0.70 0.55 - 1.02 mg/dL    Calcium Level Total 9.0 8.4 - 10.2 mg/dL    Protein Total 6.0 (L) 6.4 - 8.3 gm/dL    Albumin Level 2.5 (L) 3.5 - 5.0 g/dL    Globulin 3.5 2.4 - 3.5 gm/dL    Albumin/Globulin Ratio 0.7 (L) 1.1 - 2.0 ratio    Bilirubin Total 0.3 <=1.5 mg/dL    Alkaline Phosphatase 114 40 - 150 unit/L    Alanine Aminotransferase 18 0 - 55 unit/L    Aspartate Aminotransferase 17 5 - 34 unit/L    eGFR >60 mls/min/1.73/m2   SYPHILIS ANTIBODY (WITH REFLEX RPR)   Result Value Ref Range    Syphilis Antibody Nonreactive Nonreactive, Equivocal   CBC with Differential   Result Value Ref Range    WBC 12.5 (H) 4.5 - 11.5 x10(3)/mcL    RBC 4.22 4.20 - 5.40 x10(6)/mcL    Hgb 11.2 (L) 12.0 - 16.0 gm/dL    Hct 34.7 (L) 37.0 - 47.0 %    MCV 82.2 80.0 - 94.0 fL    MCH 26.5 pg    MCHC 32.3 (L) 33.0 - 36.0 mg/dL    RDW 14.7 11.5 - 17.0 %    Platelet 248 130 - 400 x10(3)/mcL    MPV 11.2 (H) 7.4 - 10.4 fL    Neut % 78.6 %    Lymph % 13.1 %    Mono % 5.3 %    Eos % 0.9 %    Basophil % 0.4 %    Lymph # 1.64 0.6 - 4.6 x10(3)/mcL    Neut # 9.82 (H) 2.1 - 9.2 x10(3)/mcL    Mono # 0.66 0.1 - 1.3 x10(3)/mcL    Eos # 0.11 0 - 0.9 x10(3)/mcL    Baso # 0.05 0 - 0.2 x10(3)/mcL    IG# 0.21 (H) 0 - 0.04 x10(3)/mcL    IG% 1.7 %    NRBC% 0.0 %   CBC Without Differential   Result Value Ref Range    Hemoglobin 13.9 12.0 - 16.0 g/dL    Hematocrit 42.3 36 - 46 %    Platelets 287 150 - 399 K/uL   HIV 1/2 Ag/Ab (4th Gen)   Result Value Ref Range    HIV 1/2 Ag/Ab negative     HIV-1/HIV-2 Ab negative    RPR   Result Value Ref Range    RPR Nonreactive    Rubella Antibody, IgG   Result Value Ref Range    Rubella Immune Status Immune    Type & Screen   Result Value Ref Range    Group & Rh AB NEG     Indirect Lizette GEL NEG    Hbsag - Prenatal   Result Value Ref Range    Hepatitis B Surface Ag Negative          Assessment:     1. Otitis media, unspecified  laterality, unspecified otitis media type        Plan:   Antibiotics sent to pharmacy  Start taking an allergy pill daily such as claritin, zyrtec, allegrea or xyzal. Also start using a nasal steroid spray such as flonase or nasacort daily. If you are not being treated for high blood pressure, you can also take decongestant such as sudafed as needed. They can be purchased over the counter. Monitor for fever. Take tylenol/acetaminophen or ibuprofen as needed. Rest and hydrate. If symptoms persist or worsen, return to clinic or seek medical attention immediately.       Otitis media, unspecified laterality, unspecified otitis media type    Other orders  -     betamethasone acetate-betamethasone sodium phosphate injection 12 mg  -     cefdinir (OMNICEF) 300 MG capsule; Take 1 capsule (300 mg total) by mouth 2 (two) times daily. for 10 days  Dispense: 20 capsule; Refill: 0

## 2024-04-30 NOTE — PATIENT INSTRUCTIONS
Plan:   Antibiotics sent to pharmacy  Start taking an allergy pill daily such as claritin, zyrtec, allegrea or xyzal. Also start using a nasal steroid spray such as flonase or nasacort daily. If you are not being treated for high blood pressure, you can also take decongestant such as sudafed as needed. They can be purchased over the counter. Monitor for fever. Take tylenol/acetaminophen or ibuprofen as needed. Rest and hydrate. If symptoms persist or worsen, return to clinic or seek medical attention immediately.

## 2024-06-15 ENCOUNTER — OFFICE VISIT (OUTPATIENT)
Dept: URGENT CARE | Facility: CLINIC | Age: 44
End: 2024-06-15
Payer: COMMERCIAL

## 2024-06-15 VITALS
RESPIRATION RATE: 18 BRPM | WEIGHT: 290 LBS | SYSTOLIC BLOOD PRESSURE: 137 MMHG | HEIGHT: 63 IN | TEMPERATURE: 99 F | BODY MASS INDEX: 51.38 KG/M2 | DIASTOLIC BLOOD PRESSURE: 83 MMHG | HEART RATE: 89 BPM | OXYGEN SATURATION: 98 %

## 2024-06-15 DIAGNOSIS — J02.9 VIRAL PHARYNGITIS: Primary | ICD-10-CM

## 2024-06-15 DIAGNOSIS — J02.9 SORE THROAT: ICD-10-CM

## 2024-06-15 LAB
CTP QC/QA: YES
MOLECULAR STREP A: NEGATIVE

## 2024-06-15 PROCEDURE — 99213 OFFICE O/P EST LOW 20 MIN: CPT | Mod: 25,,,

## 2024-06-15 PROCEDURE — 96372 THER/PROPH/DIAG INJ SC/IM: CPT | Mod: ,,,

## 2024-06-15 PROCEDURE — 87651 STREP A DNA AMP PROBE: CPT | Mod: QW,,,

## 2024-06-15 RX ORDER — BETAMETHASONE SODIUM PHOSPHATE AND BETAMETHASONE ACETATE 3; 3 MG/ML; MG/ML
9 INJECTION, SUSPENSION INTRA-ARTICULAR; INTRALESIONAL; INTRAMUSCULAR; SOFT TISSUE
Status: COMPLETED | OUTPATIENT
Start: 2024-06-15 | End: 2024-06-15

## 2024-06-15 RX ORDER — OMEPRAZOLE 20 MG/1
20 CAPSULE, DELAYED RELEASE ORAL DAILY
COMMUNITY

## 2024-06-15 RX ADMIN — BETAMETHASONE SODIUM PHOSPHATE AND BETAMETHASONE ACETATE 9 MG: 3; 3 INJECTION, SUSPENSION INTRA-ARTICULAR; INTRALESIONAL; INTRAMUSCULAR; SOFT TISSUE at 05:06

## 2024-06-15 NOTE — PATIENT INSTRUCTIONS
Strep negative.  Suspect some type of viral pharyngitis.     Rest and drink plenty of fluids.     Gargle with warm salt water 2 times daily.     Follow up with primary care in 5-6 days if not better.     Go directly to emergency room if you begin to have shortness of breath, chest pain, or other worrisome symptoms.

## 2024-06-15 NOTE — PROGRESS NOTES
"Subjective:      Patient ID: Jaylene Valencia is a 43 y.o. female.    Vitals:  height is 5' 3" (1.6 m) and weight is 131.5 kg (290 lb). Her oral temperature is 98.7 °F (37.1 °C). Her blood pressure is 137/83 and her pulse is 89. Her respiration is 18 and oxygen saturation is 98%.     Chief Complaint: Sore Throat     Patient is a 43 y.o. female who presents to urgent care with complaints of sore throat, hoarseness, nasal congestion, mild nausea x 4 days . Alleviating factors include advil with mild amount of relief. Patient denies any SOB, CP, rash, v/d, or neck stiffness.  Daughter has similar symptoms.       HENT:  Positive for congestion and sore throat.    Gastrointestinal:  Positive for nausea.      Objective:     Physical Exam   Constitutional: She is oriented to person, place, and time.  Non-toxic appearance. She does not appear ill.   HENT:   Ears:   Right Ear: Tympanic membrane, external ear and ear canal normal.   Left Ear: Tympanic membrane, external ear and ear canal normal.   Nose: Congestion present.   Mouth/Throat: Mucous membranes are moist. Posterior oropharyngeal erythema present. Oropharynx is clear.      Comments: Clear postnasal drip, hoarse voice  Eyes: Conjunctivae are normal.   Neck: Neck supple. No neck rigidity present.   Cardiovascular: Normal rate and normal pulses.   Pulmonary/Chest: Effort normal and breath sounds normal.   Abdominal: Normal appearance and bowel sounds are normal. Soft. There is no abdominal tenderness.   Neurological: She is alert and oriented to person, place, and time.   Skin: Skin is warm and no rash.   Psychiatric: Her behavior is normal. Mood normal.   Nursing note and vitals reviewed.      Assessment:     1. Viral pharyngitis    2. Sore throat           Previous History      Review of patient's allergies indicates:   Allergen Reactions    Penicillin g benzathine Rash       History reviewed. No pertinent past medical history.  Current Outpatient Medications " "  Medication Instructions    ibuprofen (ADVIL,MOTRIN) 400 mg, As needed (PRN)    labetaloL (NORMODYNE) 200 mg, 2 times daily    omeprazole (PRILOSEC) 20 mg, Oral, Daily    pantoprazole (PROTONIX) 20 MG tablet Take by mouth.     Past Surgical History:   Procedure Laterality Date    DILATION AND CURETTAGE OF UTERUS  2022    DILATION AND CURETTAGE OF UTERUS  2008    NO PAST SURGERIES       Family History   Problem Relation Name Age of Onset    Hypertension Mother      Arrhythmia Mother      Hypertension Father         Social History     Tobacco Use    Smoking status: Never     Passive exposure: Never    Smokeless tobacco: Never   Substance Use Topics    Alcohol use: Not Currently    Drug use: Never        Physical Exam      Vital Signs Reviewed   /83   Pulse 89   Temp 98.7 °F (37.1 °C) (Oral)   Resp 18   Ht 5' 3" (1.6 m)   Wt 131.5 kg (290 lb)   LMP 06/06/2024   SpO2 98%   Breastfeeding No   BMI 51.37 kg/m²        Procedures    Procedures     Labs     Results for orders placed or performed in visit on 06/15/24   POCT Strep A, Molecular   Result Value Ref Range    Molecular Strep A, POC Negative Negative     Acceptable Yes       Plan:       Viral pharyngitis    Sore throat  -     POCT Strep A, Molecular  -     betamethasone acetate-betamethasone sodium phosphate injection 9 mg      Strep negative.  Suspect some type of viral pharyngitis.     Rest and drink plenty of fluids.     Gargle with warm salt water 2 times daily.     Follow up with primary care in 5-6 days if not better.     Go directly to emergency room if you begin to have shortness of breath, chest pain, or other worrisome symptoms.               "

## 2024-07-16 ENCOUNTER — HOSPITAL ENCOUNTER (OUTPATIENT)
Dept: RADIOLOGY | Facility: HOSPITAL | Age: 44
Discharge: HOME | End: 2024-07-16
Attending: NURSE PRACTITIONER
Payer: COMMERCIAL

## 2024-07-16 DIAGNOSIS — Z12.31 SCREENING MAMMOGRAM FOR HIGH-RISK PATIENT: ICD-10-CM

## 2024-07-16 PROCEDURE — 77063 BREAST TOMOSYNTHESIS BI: CPT | Mod: 26,,, | Performed by: RADIOLOGY

## 2024-07-16 PROCEDURE — 77067 SCR MAMMO BI INCL CAD: CPT | Mod: 26,,, | Performed by: RADIOLOGY
